# Patient Record
Sex: FEMALE | Race: WHITE | NOT HISPANIC OR LATINO | Employment: FULL TIME | URBAN - METROPOLITAN AREA
[De-identification: names, ages, dates, MRNs, and addresses within clinical notes are randomized per-mention and may not be internally consistent; named-entity substitution may affect disease eponyms.]

---

## 2019-10-10 ENCOUNTER — HOSPITAL ENCOUNTER (OUTPATIENT)
Dept: MAMMOGRAPHY | Facility: CLINIC | Age: 59
Discharge: HOME | End: 2019-10-10
Attending: PHYSICIAN ASSISTANT | Admitting: PHYSICIAN ASSISTANT
Payer: COMMERCIAL

## 2019-10-10 DIAGNOSIS — Z12.31 VISIT FOR SCREENING MAMMOGRAM: ICD-10-CM

## 2019-10-10 PROCEDURE — 77067 SCR MAMMO BI INCL CAD: CPT

## 2019-10-30 ENCOUNTER — HOSPITAL ENCOUNTER (OUTPATIENT)
Dept: ULTRASOUND IMAGING | Facility: CLINIC | Age: 59
End: 2019-10-30
Attending: PHYSICIAN ASSISTANT
Payer: COMMERCIAL

## 2019-10-30 ENCOUNTER — HOSPITAL ENCOUNTER (OUTPATIENT)
Dept: MAMMOGRAPHY | Facility: CLINIC | Age: 59
Discharge: HOME | End: 2019-10-30
Attending: PHYSICIAN ASSISTANT | Admitting: PHYSICIAN ASSISTANT
Payer: COMMERCIAL

## 2019-10-30 DIAGNOSIS — R92.8 ABNORMAL MAMMOGRAM: ICD-10-CM

## 2019-10-30 PROCEDURE — 76642 ULTRASOUND BREAST LIMITED: CPT | Mod: LT

## 2019-10-30 PROCEDURE — G0279 TOMOSYNTHESIS, MAMMO: HCPCS

## 2021-05-17 ENCOUNTER — OFFICE VISIT (OUTPATIENT)
Dept: INTERNAL MEDICINE | Facility: CLINIC | Age: 61
End: 2021-05-17
Payer: COMMERCIAL

## 2021-05-17 VITALS
WEIGHT: 239.3 LBS | SYSTOLIC BLOOD PRESSURE: 156 MMHG | HEIGHT: 60 IN | HEART RATE: 50 BPM | TEMPERATURE: 97.7 F | BODY MASS INDEX: 46.98 KG/M2 | DIASTOLIC BLOOD PRESSURE: 83 MMHG | OXYGEN SATURATION: 94 % | RESPIRATION RATE: 16 BRPM

## 2021-05-17 DIAGNOSIS — F41.9 ANXIETY: ICD-10-CM

## 2021-05-17 DIAGNOSIS — I10 ESSENTIAL HYPERTENSION: Primary | ICD-10-CM

## 2021-05-17 DIAGNOSIS — G47.9 SLEEP DISTURBANCE: ICD-10-CM

## 2021-05-17 DIAGNOSIS — E66.01 MORBID OBESITY (H): ICD-10-CM

## 2021-05-17 DIAGNOSIS — N76.1 CHRONIC VAGINITIS: ICD-10-CM

## 2021-05-17 LAB
ALBUMIN SERPL-MCNC: 3.4 G/DL (ref 3.4–5)
ALP SERPL-CCNC: 83 U/L (ref 40–150)
ALT SERPL W P-5'-P-CCNC: 26 U/L (ref 0–50)
ANION GAP SERPL CALCULATED.3IONS-SCNC: 3 MMOL/L (ref 3–14)
AST SERPL W P-5'-P-CCNC: 14 U/L (ref 0–45)
BILIRUB SERPL-MCNC: 0.5 MG/DL (ref 0.2–1.3)
BUN SERPL-MCNC: 16 MG/DL (ref 7–30)
CALCIUM SERPL-MCNC: 9.6 MG/DL (ref 8.5–10.1)
CHLORIDE SERPL-SCNC: 102 MMOL/L (ref 94–109)
CHOLEST SERPL-MCNC: 203 MG/DL
CO2 SERPL-SCNC: 32 MMOL/L (ref 20–32)
CREAT SERPL-MCNC: 0.68 MG/DL (ref 0.52–1.04)
ERYTHROCYTE [DISTWIDTH] IN BLOOD BY AUTOMATED COUNT: 15.2 % (ref 10–15)
GFR SERPL CREATININE-BSD FRML MDRD: >90 ML/MIN/{1.73_M2}
GLUCOSE SERPL-MCNC: 114 MG/DL (ref 70–99)
HCT VFR BLD AUTO: 51.2 % (ref 35–47)
HDLC SERPL-MCNC: 46 MG/DL
HGB BLD-MCNC: 16.4 G/DL (ref 11.7–15.7)
LDLC SERPL CALC-MCNC: 133 MG/DL
MCH RBC QN AUTO: 29.6 PG (ref 26.5–33)
MCHC RBC AUTO-ENTMCNC: 32 G/DL (ref 31.5–36.5)
MCV RBC AUTO: 92 FL (ref 78–100)
NONHDLC SERPL-MCNC: 157 MG/DL
PLATELET # BLD AUTO: 309 10E9/L (ref 150–450)
POTASSIUM SERPL-SCNC: 3.7 MMOL/L (ref 3.4–5.3)
PROT SERPL-MCNC: 7.7 G/DL (ref 6.8–8.8)
RBC # BLD AUTO: 5.54 10E12/L (ref 3.8–5.2)
SODIUM SERPL-SCNC: 137 MMOL/L (ref 133–144)
TRIGL SERPL-MCNC: 121 MG/DL
TSH SERPL DL<=0.005 MIU/L-ACNC: 1.77 MU/L (ref 0.4–4)
WBC # BLD AUTO: 11 10E9/L (ref 4–11)

## 2021-05-17 PROCEDURE — 84443 ASSAY THYROID STIM HORMONE: CPT | Performed by: NURSE PRACTITIONER

## 2021-05-17 PROCEDURE — 36415 COLL VENOUS BLD VENIPUNCTURE: CPT | Performed by: NURSE PRACTITIONER

## 2021-05-17 PROCEDURE — 85027 COMPLETE CBC AUTOMATED: CPT | Performed by: NURSE PRACTITIONER

## 2021-05-17 PROCEDURE — 80053 COMPREHEN METABOLIC PANEL: CPT | Performed by: NURSE PRACTITIONER

## 2021-05-17 PROCEDURE — 80061 LIPID PANEL: CPT | Performed by: NURSE PRACTITIONER

## 2021-05-17 PROCEDURE — 99204 OFFICE O/P NEW MOD 45 MIN: CPT | Performed by: NURSE PRACTITIONER

## 2021-05-17 RX ORDER — ATENOLOL 100 MG/1
100 TABLET ORAL DAILY
Qty: 90 TABLET | Refills: 3 | Status: SHIPPED | OUTPATIENT
Start: 2021-05-17 | End: 2022-02-23

## 2021-05-17 RX ORDER — HYDROXYZINE HYDROCHLORIDE 25 MG/1
TABLET, FILM COATED ORAL
COMMUNITY
Start: 2020-08-18 | End: 2022-12-30

## 2021-05-17 RX ORDER — PHENTERMINE HYDROCHLORIDE 37.5 MG/1
37.5 TABLET ORAL
COMMUNITY
End: 2022-03-17

## 2021-05-17 RX ORDER — ATENOLOL AND CHLORTHALIDONE TABLET 100; 25 MG/1; MG/1
1 TABLET ORAL DAILY
COMMUNITY
Start: 2021-03-23 | End: 2022-02-25

## 2021-05-17 RX ORDER — DILTIAZEM HCL 60 MG
TABLET ORAL
COMMUNITY
Start: 2021-03-25 | End: 2022-03-17

## 2021-05-17 ASSESSMENT — MIFFLIN-ST. JEOR: SCORE: 1576.96

## 2021-05-17 NOTE — PROGRESS NOTES
Assessment & Plan     Essential hypertension    - CBC with platelets  - Comprehensive metabolic panel  - Lipid Profile  - TSH with free T4 reflex  - diltiazem ER COATED BEADS (CARDIAZEM LA) 120 MG 24 hr tablet; Take 1 tablet (120 mg) by mouth daily  - atenolol (TENORMIN) 100 MG tablet; Take 1 tablet (100 mg) by mouth daily    Morbid obesity (H)    - COMPREHENSIVE WEIGHT MANAGEMENT    Anxiety    - sertraline (ZOLOFT) 50 MG tablet; Take 1 tablet (50 mg) by mouth daily  - DERMATOLOGY ADULT REFERRAL; Future    Sleep disturbance    - SLEEP EVALUATION & MANAGEMENT REFERRAL - ADULT -[facility] [place]  [phone] (Age 18 and up); Future    Chronic vaginitis    - conjugated estrogens (PREMARIN) 0.625 MG/GM vaginal cream; Place 0.5 g vaginally twice a week    Med changes and additions reviewed  F/u HTN 6 weeks  Wt management and sleep referrals done           Tobacco Cessation:   reports that she has been smoking cigarettes. She has a 15.00 pack-year smoking history. She has never used smokeless tobacco.  Tobacco Cessation Action Plan: Information offered: Patient not interested at this time    BMI:   Estimated body mass index is 46.74 kg/m  as calculated from the following:    Height as of this encounter: 1.524 m (5').    Weight as of this encounter: 108.5 kg (239 lb 4.8 oz).   Weight management plan: Patient referred to endocrine and/or weight management specialty        Palma Cartagena NP  [facility] WILLIE Dennis is a 61 year old who presents for the following health issues     HPI     New Patient/Transfer of Care  Hypertension Follow-up      Do you check your blood pressure regularly outside of the clinic? Yes     Are you following a low salt diet? Yes    Are your blood pressures ever more than 140 on the top number (systolic) OR more   than 90 on the bottom number (diastolic), for example 140/90? Yes   Will double her BP meds if concerned about high  BP  hydrochlorothiazide makes her groggy  Hydroxyzine gives her dry mouth    Anxiety Follow-Up    How are you doing with your anxiety since your last visit? No change    Are you having other symptoms that might be associated with anxiety? No    Have you had a significant life event? Job Concerns     Are you feeling depressed? No    Do you have any concerns with your use of alcohol or other drugs? No    Social History     Tobacco Use     Smoking status: Current Every Day Smoker     Packs/day: 0.75     Years: 20.00     Pack years: 15.00     Types: Cigarettes     Smokeless tobacco: Never Used   Substance Use Topics     Alcohol use: No     Drug use: No       Review of Systems   CONSTITUTIONAL:POSITIVE  for weight gain  ENT/MOUTH: NEGATIVE for ear, mouth and throat problems  RESP: NEGATIVE for significant cough or SOB  CV: NEGATIVE for chest pain, palpitations or peripheral edema  GI: NEGATIVE for nausea, abdominal pain, heartburn, or change in bowel habits  ENDOCRINE: NEGATIVE for temperature intolerance, skin/hair changes  PSYCHIATRIC: POSITIVE foranxiety      Objective    BP (!) 156/83   Pulse 50   Temp 97.7  F (36.5  C) (Oral)   Resp 16   Ht 1.524 m (5')   Wt 108.5 kg (239 lb 4.8 oz)   LMP  (LMP Unknown)   SpO2 94%   BMI 46.74 kg/m      Physical Exam   GENERAL: alert, no distress and obese  PSYCH: mentation appears normal, tangential and anxious

## 2021-05-17 NOTE — NURSING NOTE
Patient here to establish care.  Vital signs:  Temp: 97.7  F (36.5  C) Temp src: Oral BP: (!) 156/83 Pulse: 50   Resp: 16 SpO2: 94 %     Height: 152.4 cm (5') Weight: 108.5 kg (239 lb 4.8 oz)  Estimated body mass index is 46.74 kg/m  as calculated from the following:    Height as of this encounter: 1.524 m (5').    Weight as of this encounter: 108.5 kg (239 lb 4.8 oz).

## 2021-09-01 ENCOUNTER — TELEPHONE (OUTPATIENT)
Dept: INTERNAL MEDICINE | Facility: CLINIC | Age: 61
End: 2021-09-01

## 2021-09-01 DIAGNOSIS — F41.9 ANXIETY: ICD-10-CM

## 2021-09-01 NOTE — TELEPHONE ENCOUNTER
Reason for Call:  Medication or medication refill:    Do you use a [facility]?  Name of the pharmacy and phone number for the current request:  [facility] [place] on [address]     Name of the medication requested: sertraline (ZOLOFT) 50 MG tablet    Other request: Patient was in recently and the provider refilled all of their prescriptions except this one, patient was hoping the provider could also refill this script.    Can we leave a detailed message on this number? YES    Phone number patient can be reached at: Home number on file [phone] (home) or Cell number on file:    Telephone Information:   Mobile [phone]       Best Time: N/a    Call taken on 9/1/2021 at 4:19 PM by Osvaldo Camara

## 2021-09-14 ENCOUNTER — OFFICE VISIT (OUTPATIENT)
Dept: DERMATOLOGY | Facility: CLINIC | Age: 61
End: 2021-09-14
Attending: NURSE PRACTITIONER
Payer: COMMERCIAL

## 2021-09-14 VITALS — BODY MASS INDEX: 44.92 KG/M2 | OXYGEN SATURATION: 93 % | HEART RATE: 62 BPM | WEIGHT: 230 LBS

## 2021-09-14 DIAGNOSIS — L82.0 INFLAMED SEBORRHEIC KERATOSIS: ICD-10-CM

## 2021-09-14 DIAGNOSIS — D48.5 NEOPLASM OF UNCERTAIN BEHAVIOR OF SKIN: ICD-10-CM

## 2021-09-14 DIAGNOSIS — L82.1 SEBORRHEIC KERATOSIS: ICD-10-CM

## 2021-09-14 DIAGNOSIS — D22.9 NEVUS: Primary | ICD-10-CM

## 2021-09-14 DIAGNOSIS — D18.01 ANGIOMA OF SKIN: ICD-10-CM

## 2021-09-14 DIAGNOSIS — L81.4 LENTIGO: ICD-10-CM

## 2021-09-14 PROCEDURE — 17110 DESTRUCTION B9 LES UP TO 14: CPT | Performed by: PHYSICIAN ASSISTANT

## 2021-09-14 PROCEDURE — 99203 OFFICE O/P NEW LOW 30 MIN: CPT | Mod: 25 | Performed by: PHYSICIAN ASSISTANT

## 2021-09-14 PROCEDURE — 11102 TANGNTL BX SKIN SINGLE LES: CPT | Mod: 59 | Performed by: PHYSICIAN ASSISTANT

## 2021-09-14 PROCEDURE — 88305 TISSUE EXAM BY PATHOLOGIST: CPT | Performed by: DERMATOLOGY

## 2021-09-14 PROCEDURE — 88342 IMHCHEM/IMCYTCHM 1ST ANTB: CPT | Performed by: DERMATOLOGY

## 2021-09-14 NOTE — LETTER
9/14/2021         RE: Jeannie Barger  [address] [address]        Dear Colleague,    Thank you for referring your patient, Jeannie Barger, to the [facility]. Please see a copy of my visit note below.    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   ROS of the following were done and are negative: Constitutional, Eyes, Ears, Nose,   Mouth, Throat, Cardiovascular, Respiratory, GI, Genitourinary, Musculoskeletal,   Psychiatric, Endocrine, Allergic/Immunologic.    PHYSICAL EXAM:   Pulse 62   Wt 104.3 kg (230 lb)   LMP  (LMP Unknown)   SpO2 93%   BMI 44.92 kg/m    Skin exam performed as follows: Type 2 skin. Mood appropriate  Alert and Oriented X 3. Well developed, well nourished in no distress.  General appearance: Normal  Head including face: Normal  Eyes: conjunctiva and lids: Normal  Mouth: Lips, teeth, gums: Normal  Neck: Normal  Chest-breast/axillae: Normal  Back: Normal  Spleen and liver: Normal  Cardiovascular: Exam of peripheral vascular system by observation for swelling, varicosities, edema: Normal  Genitalia: groin, buttocks: Normal  Extremities: digits/nails (clubbing): Normal  Eccrine and Apocrine glands: Normal  Right upper extremity: Normal  Left upper extremity: Normal  Right lower extremity: Normal  Left lower extremity:  Normal  Skin: Scalp and body hair: See below    Pt deferred exam of breasts, groin, buttocks: No    Other physical findings:  1. Multiple pigmented macules on extremities and trunk  2. Multiple pigmented macules on face, trunk and extremities  3. Multiple vascular papules on trunk, arms and legs  4. Multiple scattered keratotic plaques  5. Irregular 6 mm black macule on the right upper forehead  6. Inflamed keratotic papule on the right temple x 1, nasal bridge x 1, neck x 3, left upper cheek x 1, right breast x 1, left breast x 1, back x 1       Except as noted above, no other signs of skin cancer or melanoma.     ASSESSMENT/PLAN:   Benign Full skin cancer screening today. . Patient with history of none  Advised on monthly self exams and 1 year  Patient Education: Appropriate brochures given.    1. Multiple benign appearing nevi on arms, legs and trunk. Discussed ABCDEs of melanoma and sunscreen.   2. Multiple lentigos on arms, legs and trunk. Advised benign, no treatment needed.  3. Multiple scattered angiomas. Advised benign, no treatment needed.   4. Seborrheic keratosis on arms, legs and trunk. Advised benign, no treatment needed.  5. Lentigo r/o lentigo maligna on the right upper forehead. Shave biopsy performed.  Area cleaned and anesthetized with 1% lidocaine with epinephrine.  Dermablade used to remove the lesion and sent to pathology. Bleeding was cauterized. Pt tolerated procedure well with no complications.   6. Inflamed seborrheic keratosis on the right temple x 1, nasal bridge x 1, neck x 3, left upper cheek x 1, right breast x 1, left breast x 1, back x 1. As physically tender cryosurgery performed. Advised on post op care.             Follow-up: yearly FSE/PRN sooner    1.) Patient was asked about new and changing moles. YES  2.) Patient received a complete physical skin examination: YES  3.) Patient was counseled to perform a monthly self skin examination: YES  Scribed By: Denice Maurer MS,  RYAN          Again, thank you for allowing me to participate in the care of your patient.        Sincerely,        Denice Casper PA-C

## 2021-09-14 NOTE — NURSING NOTE
Initial Pulse 62   Wt 104.3 kg (230 lb)   LMP  (LMP Unknown)   SpO2 93%   BMI 44.92 kg/m   Estimated body mass index is 44.92 kg/m  as calculated from the following:    Height as of 5/17/21: 1.524 m (5').    Weight as of this encounter: 104.3 kg (230 lb). .    JITENDRA Bhatia-ARIEN-PHN  [facility]  [phone]

## 2021-09-14 NOTE — PATIENT INSTRUCTIONS
Wound Care Instructions     FOR SUPERFICIAL WOUNDS    [facility] [phone]                       AFTER 24 HOURS YOU SHOULD REMOVE THE BANDAGE AND BEGIN DAILY DRESSING CHANGES AS FOLLOWS:     1) Remove Dressing.     2) Clean and dry the area with tap water using a Q-tip or sterile gauze pad.     3) Apply Vaseline, Aquaphor, Polysporin ointment or Bacitracin ointment over entire wound.  Do NOT use Neosporin ointment.     4) Cover the wound with a band-aid, or a sterile non-stick gauze pad and micropore paper tape      REPEAT THESE INSTRUCTIONS AT LEAST ONCE A DAY UNTIL THE WOUND HAS COMPLETELY HEALED.    It is an old wives tale that a wound heals better when it is exposed to air and allowed to dry out. The wound will heal faster with a better cosmetic result if it is kept moist with ointment and covered with a bandage.    **Do not let the wound dry out.**      Supplies Needed:      *Cotton tipped applicators (Q-tips)    *Polysporin Ointment or Bacitracin Ointment (NOT NEOSPORIN)    *Band-aids or non-stick gauze pads and micropore paper tape.      PATIENT INFORMATION:    During the healing process you will notice a number of changes. All wounds develop a small halo of redness surrounding the wound.  This means healing is occurring. Severe itching with extensive redness usually indicates sensitivity to the ointment or bandage tape used to dress the wound.  You should call our office if this develops.      Swelling  and/or discoloration around your surgical site is common, particularly when performed around the eye.    All wounds normally drain.  The larger the wound the more drainage there will be.  After 7-10 days, you will notice the wound beginning to shrink and new skin will begin to grow.  The wound is healed when you can see skin has formed over the entire area.  A healed wound has a healthy, shiny look to the surface and is red to dark pink in color to normalize.  Wounds may take approximately  4-6 weeks to heal.  Larger wounds may take 6-8 weeks.  After the wound is healed you may discontinue dressing changes.    You may experience a sensation of tightness as your wound heals. This is normal and will gradually subside.    Your healed wound may be sensitive to temperature changes. This sensitivity improves with time, but if you re having a lot of discomfort, try to avoid temperature extremes.    Patients frequently experience itching after their wound appears to have healed because of the continue healing under the skin.  Plain Vaseline will help relieve the itching.        POSSIBLE COMPLICATIONS    BLEEDIN. Leave the bandage in place.  2. Use tightly rolled up gauze or a cloth to apply direct pressure over the bandage for 30  minutes.  3. Reapply pressure for an additional 30 minutes if necessary  4. Use additional gauze and tape to maintain pressure once the bleeding has stopped.  WOUND CARE INSTRUCTIONS   FOR CRYOSURGERY   This area treated with liquid nitrogen should form a blister (areas treated may or may not blister-skin may just turn dark and slough off). You do not need to bandage the area unless a blister forms and breaks (which may be a few days). When the blister breaks, begin daily dressing changes as follows:  1) Clean and dry the area with tap water using clean Q-tip or sterile gauze pad.   2) Apply Polysporin ointment or Bacitracin ointment over entire wound. Do NOT use Neosporin ointment.   3) Cover the wound with a band-aid or sterile non-stick gauze pad and micropore paper tape.   REPEAT THESE INSTRUCTIONS AT LEAST ONCE A DAY UNTIL THE WOUND HAS COMPLETELY HEALED.   It is an old wives tale that a wound heals better when it is exposed to air and allowed to dry out. The wound will heal faster with a better cosmetic result if it is kept moist with ointment and covered with a bandage.   Do not let the wound dry out.   IMPORTANT INFORMATION ON REVERSE SIDE   Supplies Needed:   *Cotton  tipped applicators (Q-tips)   *Polysporin ointment or Bacitracin ointment (NOT NEOSPORIN)   *Band-aids, or non stick gauze pads and micropore paper tape   PATIENT INFORMATION   During the healing process you will notice a number of changes. All wounds develop a small halo of redness surrounding the wound. This means healing is occurring. Severe itching with extensive redness usually indicates sensitivity to the ointment or bandage tape used to dress the wound. You should call our office if this develops.   Swelling and/or discoloration around your surgical site is common, particularly when performed around the eye.   All wounds normally drain. The larger the wound the more drainage there will be. After 7-10 days, you will notice the wound beginning to shrink and new skin will begin to grow. The wound is healed when you can see skin has formed over the entire area. A healed wound has a healthy, shiny look to the surface and is red to dark pink in color to normalize. Wounds may take approximately 4-6 weeks to heal. Larger wounds may take 6-8 weeks. After the wound is healed you may discontinue dressing changes.   You may experience a sensation of tightness as your wound heals. This is normal and will gradually subside.   Your healed wound may be sensitive to temperature changes. This sensitivity improves with time, but if you re having a lot of discomfort, try to avoid temperature extremes.   Patients frequently experience itching after their wound appears to have healed because of the continue healing under the skin. Plain Vaseline will help relieve the itching.          Statement Selected

## 2021-09-22 LAB
PATH REPORT.COMMENTS IMP SPEC: ABNORMAL
PATH REPORT.COMMENTS IMP SPEC: YES
PATH REPORT.FINAL DX SPEC: ABNORMAL
PATH REPORT.GROSS SPEC: ABNORMAL
PATH REPORT.MICROSCOPIC SPEC OTHER STN: ABNORMAL
PATH REPORT.RELEVANT HX SPEC: ABNORMAL

## 2021-09-24 ENCOUNTER — TELEPHONE (OUTPATIENT)
Dept: DERMATOLOGY | Facility: CLINIC | Age: 61
End: 2021-09-24

## 2021-09-24 NOTE — TELEPHONE ENCOUNTER
----- Message from Denice Maurer PA-C sent at 9/23/2021  9:05 AM CDT -----  Notified pt of results of MIS please schedule for excision. She is available today until 1:30 and then tomorrow between 9am and 130 pm.

## 2021-09-24 NOTE — TELEPHONE ENCOUNTER
Called and spoke to patient.    Patient notified of dx by provider- MIS, right upper forehead.    Educated patient on mohs, scheduled mohs appointment (NERY Fragoso), and letter/packet sent.    Patient voiced understanding.    JITENDRA Bhatia-BSN-N  [facility]  [phone]

## 2021-09-24 NOTE — LETTER
[facility]  [address]  [id number] [phone]    9/24/2021       Jeannie Barger  [address] [place] [address]      Dear Jeannie:    You are scheduled for Mohs Surgery on: 10/11/21 @7:30am in [place], [place].    [facility]  [address] [place] [address]  Phone: [phone]    You don't need to arrive more than 5-10 minutes prior to your appointment time.     Be sure to eat a good breakfast and bathe and wash your hair prior to surgery.     If you are taking any anti-coagulants that are prescribed by your Doctor (such as Coumadin/Warfarin, Plavix, Aspirin, Ibuprofen), please continue taking them.     However, if you are taking anti-coagulants over the counter without a Doctor's order for a medical condition, please discontinue them 10 days prior to surgery.     Please wear loose comfortable clothing as it could possibly be 4-6 hours until your surgery is completed depending upon how many layers of tissue need to be removed.      Thank you,    GERTRUDIS Ahn MD

## 2021-10-11 ENCOUNTER — OFFICE VISIT (OUTPATIENT)
Dept: DERMATOLOGY | Facility: CLINIC | Age: 61
End: 2021-10-11
Payer: COMMERCIAL

## 2021-10-11 VITALS — OXYGEN SATURATION: 93 % | SYSTOLIC BLOOD PRESSURE: 137 MMHG | DIASTOLIC BLOOD PRESSURE: 72 MMHG | HEART RATE: 47 BPM

## 2021-10-11 DIAGNOSIS — D03.30 MELANOMA IN SITU OF FACE (H): Primary | ICD-10-CM

## 2021-10-11 PROCEDURE — 17311 MOHS 1 STAGE H/N/HF/G: CPT | Performed by: DERMATOLOGY

## 2021-10-11 PROCEDURE — 99207 PR NO CHARGE LOS: CPT | Performed by: DERMATOLOGY

## 2021-10-11 PROCEDURE — 88342 IMHCHEM/IMCYTCHM 1ST ANTB: CPT | Mod: 59 | Performed by: DERMATOLOGY

## 2021-10-11 PROCEDURE — 13132 CMPLX RPR F/C/C/M/N/AX/G/H/F: CPT | Performed by: DERMATOLOGY

## 2021-10-11 PROCEDURE — 88341 IMHCHEM/IMCYTCHM EA ADD ANTB: CPT | Mod: 59 | Performed by: DERMATOLOGY

## 2021-10-11 NOTE — NURSING NOTE
Chief Complaint   Patient presents with     Derm Problem     Mohs- Forehead        Vitals:    10/11/21 0745   BP: 137/72   BP Location: Right arm   Patient Position: Sitting   Cuff Size: Adult Large   Pulse: (!) 47   SpO2: 93%     Wt Readings from Last 1 Encounters:   09/14/21 104.3 kg (230 lb)       Barbara Price LPN .................10/11/2021

## 2021-10-11 NOTE — PATIENT INSTRUCTIONS
Sutured Wound Care  Forehead     [facility]: [phone]    [facility]: [phone]          ? No strenuous activity for 48 hours. Resume moderate activity in 48 hours. No heavy exercising until you are seen for follow up in one week.     ? Take Tylenol as needed for discomfort.                         ? Do not drink alcoholic beverages for 48 hours.     ? Keep the pressure bandage in place for 24 hours. If the bandage becomes blood tinged or loose, reinforce it with gauze and tape.        (Refer to the reverse side of this page for management of bleeding).    ? Remove pressure bandage in 24 hours     ? Leave the flat bandage in place until your follow up appointment.    ? Keep the bandage dry. Wash around it carefully.    ? If the tape becomes soiled or starts to come off, reinforce it with additional paper tape.    ? Do not smoke for 3 weeks; smoking is detrimental to wound healing.    ? It is normal to have swelling and bruising around the surgical site. The bruising will fade in approximately 10-14 days. Elevate the area to reduce swelling.    ? Numbness, itchiness and sensitivity to temperature changes can occur after surgery and may take up to 18 months to normalize.      POSSIBLE COMPLICATIONS    BLEEDIN. Leave the bandage in place.  2. Use tightly rolled up gauze or a cloth to apply direct pressure over the bandage for 20   minutes.  3. Reapply pressure for an additional 20 minutes if necessary  4. Call the office or go to the nearest emergency room if pressure fails to stop the bleeding.  5. Use additional gauze and tape to maintain pressure once the bleeding has stopped.        PAIN:    1. Post operative pain should slowly get better, never worse.  2. A severe increase in pain may indicate a problem. Call the office if this occurs.    In case of emergency phone:Dr Ahn [phone]

## 2021-10-11 NOTE — PROGRESS NOTES
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 Patient has no other skin complaints today.  Remainder of the HPI, Meds, PMH, Allergies, FH, and SH was reviewed in chart.    No past medical history on file.    Past Surgical History:   Procedure Laterality Date     C  DELIVERY ONLY      , Low Cervical x 2     C LIGATE FALLOPIAN TUBE,POSTPARTUM      Tubal Ligation     C TREAT ECTOPIC PREG,NON REMVAL      Tube & Ectopic Preg., Removal     HC LAPAROSCOPY, SURGICAL; CHOLECYSTECTOMY      Cholecystectomy, Laparoscopic     HC REVISE MEDIAN N/CARPAL TUNNEL SURG       ZZC NONSPECIFIC PROCEDURE      Removal of cart. in l knee        Family History   Problem Relation Age of Onset     Diabetes Mother      Hypertension Mother      Heart Disease Paternal Grandmother         mid 60s     Hypertension Father      Multiple Sclerosis Brother      Hypertension Sister      Cancer No family hx of      Alzheimer Disease No family hx of        Social History     Socioeconomic History     Marital status:      Spouse name: Not on file     Number of children: Not on file     Years of education: Not on file     Highest education level: Not on file   Occupational History     Not on file   Tobacco Use     Smoking status: Current Every Day Smoker     Packs/day: 0.75     Years: 20.00     Pack years: 15.00     Types: Cigarettes     Smokeless tobacco: Never Used   Substance and Sexual Activity     Alcohol use: No     Drug use: No     Sexual activity: Yes     Partners: Male   Other Topics Concern     Not on file   Social History Narrative     Not on file     Social Determinants of Health     Financial Resource Strain:      Difficulty of Paying Living Expenses:    Food Insecurity:      Worried About Running Out of Food in the Last Year:      Ran Out of Food in the Last Year:    Transportation Needs:       Lack of Transportation (Medical):      Lack of Transportation (Non-Medical):    Physical Activity:      Days of Exercise per Week:      Minutes of Exercise per Session:    Stress:      Feeling of Stress :    Social Connections:      Frequency of Communication with Friends and Family:      Frequency of Social Gatherings with Friends and Family:      Attends Cheondoism Services:      Active Member of Clubs or Organizations:      Attends Club or Organization Meetings:      Marital Status:    Intimate Partner Violence:      Fear of Current or Ex-Partner:      Emotionally Abused:      Physically Abused:      Sexually Abused:        Outpatient Encounter Medications as of 10/11/2021   Medication Sig Dispense Refill     atenolol (TENORMIN) 100 MG tablet Take 1 tablet (100 mg) by mouth daily 90 tablet 3     atenolol-chlorthalidone (TENORETIC) 100-25 MG tablet Take 1 tablet by mouth daily       conjugated estrogens (PREMARIN) 0.625 MG/GM vaginal cream Place 0.5 g vaginally twice a week 30 g 1     diltiazem (CARDIZEM) 60 MG tablet TAKE 1 TABLET BY MOUTH TWICE A DAY       diltiazem ER COATED BEADS (CARDIAZEM LA) 120 MG 24 hr tablet Take 1 tablet (120 mg) by mouth daily 90 tablet 1     hydrOXYzine (ATARAX) 25 MG tablet TAKE 1 2 TABLETS EVERY 4 6 HOURS AS NEEDED FOR ANXIETY       phentermine (ADIPEX-P) 37.5 MG tablet Take 37.5 mg by mouth every morning (before breakfast)       sertraline (ZOLOFT) 50 MG tablet Take 1 tablet (50 mg) by mouth daily 90 tablet 2     No facility-administered encounter medications on file as of 10/11/2021.             O:   NAD, WDWN, Alert & Oriented, Mood & Affect wnl, Vitals stable   Here today alone   /72 (BP Location: Right arm, Patient Position: Sitting, Cuff Size: Adult Large)   Pulse (!) 47   LMP  (LMP Unknown)   SpO2 93%    General appearance normal   Vitals stable   Alert, oriented and in no acute distress      Following lymph nodes palpated: Occipital, Cervical, Supraclavicular no  lad   R upper forehead 6mm red macule      Eyes: Conjunctivae/lids:Normal     ENT: Lips, buccal mucosa, tongue: normal    MSK:Normal    Cardiovascular: peripheral edema none    Pulm: Breathing Normal    Neuro/Psych: Orientation:Alert and Orientedx3 ; Mood/Affect:normal       A/P:  1. R forehead melanoma in situ   MELANOMA DISCUSSED WITH PATIENT:  I discussed the specifics of tumor, prognosis, metachronous melanoma, self exam, and genetics with the patient. I explained the need for monthly skin exams including and taught the patient how to do this. Patient was asked about new or changing moles . I discussed with patient signs and symptoms that could arise in the setting of recurrent locoregional or metastatic disease. In addition, the need to undergo every 4 month dermatologic full skin survey and evaluation given that patients with a diagnosis of melanoma are at risk of recurrence (local and distant) and of subsequent de melissa melanoma.  . I reviewed treatment options, including a discussion of wide excision (the gold standard) versus Mohs surgery with MART-1 immunostains.     Note: MART-1 (Melanoma Antigen Recognized by T-cells) antibody immunostaining was used during Mohs surgery as per standard protocol, in addition to routine processing of all specimens with hematoxylin and eosin. The peripheral margins/edges were processed with the MART-1 stain (2 specimens total). The center was examined with hematoxylin & eosin and MART-1 immunostains. The patient was informed of the procedure and its risk/benefits during the consent for the procedure.    One or more of the reagents used in immunohistochemical testing in this case may not have been cleared or approved by the U.S. Food and Drug Administration (FDA). The FDA has determined that such clearance or approval is not necessary. These tests are used for clinical purposes. They should not be regarded as investigational or for research. These reagents  performance  characteristics have been determined by [facility]. This laboratory is certified under the Clinical Laboratory Improvement Amendments of 1988 (CLIA-88) as qualified to perform high complexity clinical laboratory testing.      MOHS:   Aggressive histology    The rationale for Mohs surgery was discussed with the patient and consent was obtained.  The risks and benefits as well as alternatives to therapy were discussed, in detail.  Specifically, the risks of infection, scarring, bleeding, prolonged wound healing, incomplete removal, allergy to anesthesia, nerve injury and recurrence were addressed.  Indication for Mohs was Aggressive histology. Prior to the procedure, the treatment site was clearly identified and, if available, confirmed with previous photos and confirmed by the patient   All components of the Universal Protocol/PAUSE rule were completed.  The Mohs surgeon operated in two distinct and integrated capacities as the surgeon and pathologist.      The area was prepped with Betasept.  A rim of normal appearing skin was marked circumferentially around the lesion.  The area was infiltrated with local anesthesia.  The tumor was first debulked to remove all clinically apparent tumor.  An incision following the standard Mohs approach was done and the specimen was oriented,mapped and placed in 3 block(s).  Each specimen was then chromacoded and processed in the Mohs laboratory using standard Mohs technique and submitted for frozen section histology.  Frozen section analysis showed no  residual tumor but CLEAR MARGINS.      The tumor was excised using standard Mohs technique in 1 stages(s).  MART 1 stains were performed on 2 specimens. CLEAR MARGINS OBTAINED and Final defect size was 1.5 cm.     We discussed the options for wound management in full with the patient including risks/benefits/ possible outcomes.      REPAIR COMPLEX: Because of the tightness of the surrounding skin and Because of the  size and full thickness nature of the defect, Because of the tightness of the surrounding skin and To maintain form and function, a complex closure was planned. After LEC anesthesia and prep, Burow's triangles were excised in the relaxed skin tension lines. The wound edges were widely undermined greater than width of the defect on both sides (1.5 cm) by dissection in the subgaleal plane until adequate tissue mobility was obtained. Hemostasis was obtained. The wound edges were closed in a layered fashion using Vicryl and Fast Absorbing Plain Gut sutures. Postoperative length was 4.5 cm.   EBL minimal; complications none; wound care routine.  The patient was discharged in good condition and will return in one week for wound evaluation.      It was a pleasure speaking to Jeannie Barger today.  Previous clinic notes and pertinent laboratory tests were reviewed prior to Jeannie Barger's visit.  Signs and Symptoms of skin cancer discussed with patient.  Patient encouraged to perform monthly skin exams.  UV precautions reviewed with patient.  Return to clinic 6 months

## 2021-10-11 NOTE — LETTER
10/11/2021         RE: Jeannie Barger  [address] [place] [address]        Dear Colleague,    Thank you for referring your patient, Jeannie Barger, to the [facility]. Please see a copy of my visit note below.    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 Patient has no other skin complaints today.  Remainder of the HPI, Meds, PMH, Allergies, FH, and SH was reviewed in chart.    No past medical history on file.    Past Surgical History:   Procedure Laterality Date     C  DELIVERY ONLY      , Low Cervical x 2     C LIGATE FALLOPIAN TUBE,POSTPARTUM      Tubal Ligation     C TREAT ECTOPIC PREG,NON REMVAL      Tube & Ectopic Preg., Removal     HC LAPAROSCOPY, SURGICAL; CHOLECYSTECTOMY      Cholecystectomy, Laparoscopic     HC REVISE MEDIAN N/CARPAL TUNNEL SURG       ZZC NONSPECIFIC PROCEDURE      Removal of cart. in l knee        Family History   Problem Relation Age of Onset     Diabetes Mother      Hypertension Mother      Heart Disease Paternal Grandmother         mid 60s     Hypertension Father      Multiple Sclerosis Brother      Hypertension Sister      Cancer No family hx of      Alzheimer Disease No family hx of        Social History     Socioeconomic History     Marital status:      Spouse name: Not on file     Number of children: Not on file     Years of education: Not on file     Highest education level: Not on file   Occupational History     Not on file   Tobacco Use     Smoking status: Current Every Day Smoker     Packs/day: 0.75     Years: 20.00     Pack years: 15.00     Types: Cigarettes     Smokeless tobacco: Never Used   Substance and Sexual Activity     Alcohol use: No     Drug use: No     Sexual activity: Yes     Partners: Male   Other Topics Concern     Not on file   Social History Narrative     Not on file      Social Determinants of Health     Financial Resource Strain:      Difficulty of Paying Living Expenses:    Food Insecurity:      Worried About Running Out of Food in the Last Year:      Ran Out of Food in the Last Year:    Transportation Needs:      Lack of Transportation (Medical):      Lack of Transportation (Non-Medical):    Physical Activity:      Days of Exercise per Week:      Minutes of Exercise per Session:    Stress:      Feeling of Stress :    Social Connections:      Frequency of Communication with Friends and Family:      Frequency of Social Gatherings with Friends and Family:      Attends Anabaptism Services:      Active Member of Clubs or Organizations:      Attends Club or Organization Meetings:      Marital Status:    Intimate Partner Violence:      Fear of Current or Ex-Partner:      Emotionally Abused:      Physically Abused:      Sexually Abused:        Outpatient Encounter Medications as of 10/11/2021   Medication Sig Dispense Refill     atenolol (TENORMIN) 100 MG tablet Take 1 tablet (100 mg) by mouth daily 90 tablet 3     atenolol-chlorthalidone (TENORETIC) 100-25 MG tablet Take 1 tablet by mouth daily       conjugated estrogens (PREMARIN) 0.625 MG/GM vaginal cream Place 0.5 g vaginally twice a week 30 g 1     diltiazem (CARDIZEM) 60 MG tablet TAKE 1 TABLET BY MOUTH TWICE A DAY       diltiazem ER COATED BEADS (CARDIAZEM LA) 120 MG 24 hr tablet Take 1 tablet (120 mg) by mouth daily 90 tablet 1     hydrOXYzine (ATARAX) 25 MG tablet TAKE 1 2 TABLETS EVERY 4 6 HOURS AS NEEDED FOR ANXIETY       phentermine (ADIPEX-P) 37.5 MG tablet Take 37.5 mg by mouth every morning (before breakfast)       sertraline (ZOLOFT) 50 MG tablet Take 1 tablet (50 mg) by mouth daily 90 tablet 2     No facility-administered encounter medications on file as of 10/11/2021.             O:   NAD, WDWN, Alert & Oriented, Mood & Affect wnl, Vitals stable   Here today alone   /72 (BP Location: Right arm, Patient  Position: Sitting, Cuff Size: Adult Large)   Pulse (!) 47   LMP  (LMP Unknown)   SpO2 93%    General appearance normal   Vitals stable   Alert, oriented and in no acute distress      Following lymph nodes palpated: Occipital, Cervical, Supraclavicular no lad   R upper forehead 6mm red macule      Eyes: Conjunctivae/lids:Normal     ENT: Lips, buccal mucosa, tongue: normal    MSK:Normal    Cardiovascular: peripheral edema none    Pulm: Breathing Normal    Neuro/Psych: Orientation:Alert and Orientedx3 ; Mood/Affect:normal       A/P:  1. R forehead melanoma in situ   MELANOMA DISCUSSED WITH PATIENT:  I discussed the specifics of tumor, prognosis, metachronous melanoma, self exam, and genetics with the patient. I explained the need for monthly skin exams including and taught the patient how to do this. Patient was asked about new or changing moles . I discussed with patient signs and symptoms that could arise in the setting of recurrent locoregional or metastatic disease. In addition, the need to undergo every 4 month dermatologic full skin survey and evaluation given that patients with a diagnosis of melanoma are at risk of recurrence (local and distant) and of subsequent de melissa melanoma.  . I reviewed treatment options, including a discussion of wide excision (the gold standard) versus Mohs surgery with MART-1 immunostains.     Note: MART-1 (Melanoma Antigen Recognized by T-cells) antibody immunostaining was used during Mohs surgery as per standard protocol, in addition to routine processing of all specimens with hematoxylin and eosin. The peripheral margins/edges were processed with the MART-1 stain (2 specimens total). The center was examined with hematoxylin & eosin and MART-1 immunostains. The patient was informed of the procedure and its risk/benefits during the consent for the procedure.    One or more of the reagents used in immunohistochemical testing in this case may not have been cleared or approved by  the U.S. Food and Drug Administration (FDA). The FDA has determined that such clearance or approval is not necessary. These tests are used for clinical purposes. They should not be regarded as investigational or for research. These reagents  performance characteristics have been determined by [facility]. This laboratory is certified under the Clinical Laboratory Improvement Amendments of 1988 (CLIA-88) as qualified to perform high complexity clinical laboratory testing.      MOHS:   Aggressive histology    The rationale for Mohs surgery was discussed with the patient and consent was obtained.  The risks and benefits as well as alternatives to therapy were discussed, in detail.  Specifically, the risks of infection, scarring, bleeding, prolonged wound healing, incomplete removal, allergy to anesthesia, nerve injury and recurrence were addressed.  Indication for Mohs was Aggressive histology. Prior to the procedure, the treatment site was clearly identified and, if available, confirmed with previous photos and confirmed by the patient   All components of the Universal Protocol/PAUSE rule were completed.  The Mohs surgeon operated in two distinct and integrated capacities as the surgeon and pathologist.      The area was prepped with Betasept.  A rim of normal appearing skin was marked circumferentially around the lesion.  The area was infiltrated with local anesthesia.  The tumor was first debulked to remove all clinically apparent tumor.  An incision following the standard Mohs approach was done and the specimen was oriented,mapped and placed in 3 block(s).  Each specimen was then chromacoded and processed in the Mohs laboratory using standard Mohs technique and submitted for frozen section histology.  Frozen section analysis showed no  residual tumor but CLEAR MARGINS.      The tumor was excised using standard Mohs technique in 1 stages(s).  MART 1 stains were performed on 2 specimens. CLEAR MARGINS  OBTAINED and Final defect size was 1.5 cm.     We discussed the options for wound management in full with the patient including risks/benefits/ possible outcomes.      REPAIR COMPLEX: Because of the tightness of the surrounding skin and Because of the size and full thickness nature of the defect, Because of the tightness of the surrounding skin and To maintain form and function, a complex closure was planned. After LEC anesthesia and prep, Burow's triangles were excised in the relaxed skin tension lines. The wound edges were widely undermined greater than width of the defect on both sides (1.5 cm) by dissection in the subgaleal plane until adequate tissue mobility was obtained. Hemostasis was obtained. The wound edges were closed in a layered fashion using Vicryl and Fast Absorbing Plain Gut sutures. Postoperative length was 4.5 cm.   EBL minimal; complications none; wound care routine.  The patient was discharged in good condition and will return in one week for wound evaluation.      It was a pleasure speaking to Jeannie Barger today.  Previous clinic notes and pertinent laboratory tests were reviewed prior to Jeannie Barger's visit.  Signs and Symptoms of skin cancer discussed with patient.  Patient encouraged to perform monthly skin exams.  UV precautions reviewed with patient.  Return to clinic 6 months        Again, thank you for allowing me to participate in the care of your patient.        Sincerely,        Parker Ahn MD

## 2021-10-11 NOTE — NURSING NOTE
Surgical Office Location :   [facility]  [address] [place] [address]

## 2021-10-18 ENCOUNTER — ALLIED HEALTH/NURSE VISIT (OUTPATIENT)
Dept: DERMATOLOGY | Facility: CLINIC | Age: 61
End: 2021-10-18
Payer: COMMERCIAL

## 2021-10-18 DIAGNOSIS — Z48.01 ENCOUNTER FOR CHANGE OR REMOVAL OF SURGICAL WOUND DRESSING: Primary | ICD-10-CM

## 2021-10-18 PROCEDURE — 99207 PR NO CHARGE NURSE ONLY: CPT

## 2021-10-18 NOTE — PATIENT INSTRUCTIONS

## 2021-10-18 NOTE — NURSING NOTE
Pt returned to clinic for post surgery 1 week follow up bandage change. Pt has no complaints, denies pain. Bandage removed from right forehead, area cleansed with normal saline. Site is healing and wound edges approximating well. Reapplied new steri strips and paper tape.    Advised to watch for signs/sx of infection; spreading redness, drainage, odor, fever. Call or report promptly to clinic. Pt given written instructions and informed to rtc as needed. Patient verbalized understanding.     JITENDRA Bhatia-BSN-PHN  [facility]  [phone]

## 2022-02-01 ENCOUNTER — HOSPITAL ENCOUNTER (OUTPATIENT)
Dept: MAMMOGRAPHY | Facility: CLINIC | Age: 62
Discharge: HOME | End: 2022-02-01
Payer: COMMERCIAL

## 2022-02-01 DIAGNOSIS — Z12.31 VISIT FOR SCREENING MAMMOGRAM: ICD-10-CM

## 2022-02-01 PROCEDURE — 77067 SCR MAMMO BI INCL CAD: CPT

## 2022-02-23 DIAGNOSIS — I10 ESSENTIAL HYPERTENSION: ICD-10-CM

## 2022-02-23 RX ORDER — ATENOLOL 100 MG/1
100 TABLET ORAL DAILY
Qty: 30 TABLET | Refills: 0 | Status: SHIPPED | OUTPATIENT
Start: 2022-02-23 | End: 2022-03-17

## 2022-02-23 RX ORDER — DILTIAZEM HCL 60 MG
TABLET ORAL
OUTPATIENT
Start: 2022-02-23

## 2022-02-23 NOTE — TELEPHONE ENCOUNTER
Routing refill request to provider for review/approval because:  Patient needs to be seen because:  Was supposed to follow up in June for blood pressure check.   Medication is reported historical-Diltiazem    Patient also getting refills from [facility]

## 2022-02-23 NOTE — LETTER
[facility]  [address], [address]  [place], [place]  [address]                                            TEL:[phone]  FAX:[phone]      Jeannie Barger  [address]  [facility] [address]      February 24, 2022    Dear Jeannie,    We are concerned about your health care.  We recently provided you with a medication refill.  Many medications require routine follow-up with your provider.      At this time we ask that: You schedule a routine office visit with your physician to follow your blood pressure    Your prescription: Has been refilled for 1 month so you may have time for the above noted follow-up.      Thank you,      Palma Cartagena C.N.P.

## 2022-02-25 DIAGNOSIS — I10 ESSENTIAL HYPERTENSION: ICD-10-CM

## 2022-02-25 RX ORDER — ATENOLOL AND CHLORTHALIDONE TABLET 100; 25 MG/1; MG/1
1 TABLET ORAL DAILY
Qty: 90 TABLET | Refills: 0 | Status: SHIPPED | OUTPATIENT
Start: 2022-02-25 | End: 2022-05-31

## 2022-02-25 NOTE — TELEPHONE ENCOUNTER
Pt calls regarding her refills. She states she is coming to [facility] now, not [facility].  Scheduled first available appt with Palma. 3/17/22.     She needs refills on her Atenolol/Chlorthalidone. She states she has always taken this.   Also needs the Diltiazem.   She prefers 90 days at a time if possible.     Last OV on 5/17/21    BP Readings from Last 3 Encounters:   10/11/21 137/72   05/17/21 (!) 156/83   09/11/03 122/80     Creatinine   Date Value Ref Range Status   05/17/2021 0.68 0.52 - 1.04 mg/dL Final

## 2022-03-17 ENCOUNTER — TELEPHONE (OUTPATIENT)
Dept: INTERNAL MEDICINE | Facility: CLINIC | Age: 62
End: 2022-03-17

## 2022-03-17 ENCOUNTER — OFFICE VISIT (OUTPATIENT)
Dept: INTERNAL MEDICINE | Facility: CLINIC | Age: 62
End: 2022-03-17
Payer: COMMERCIAL

## 2022-03-17 VITALS
WEIGHT: 250 LBS | RESPIRATION RATE: 24 BRPM | SYSTOLIC BLOOD PRESSURE: 177 MMHG | OXYGEN SATURATION: 94 % | BODY MASS INDEX: 49.08 KG/M2 | HEIGHT: 60 IN | TEMPERATURE: 98.3 F | DIASTOLIC BLOOD PRESSURE: 86 MMHG | HEART RATE: 62 BPM

## 2022-03-17 DIAGNOSIS — F41.9 ANXIETY: ICD-10-CM

## 2022-03-17 DIAGNOSIS — M54.50 LOW BACK PAIN, UNSPECIFIED BACK PAIN LATERALITY, UNSPECIFIED CHRONICITY, UNSPECIFIED WHETHER SCIATICA PRESENT: ICD-10-CM

## 2022-03-17 DIAGNOSIS — N95.2 ATROPHIC VAGINITIS: ICD-10-CM

## 2022-03-17 DIAGNOSIS — I10 BENIGN ESSENTIAL HYPERTENSION: Primary | ICD-10-CM

## 2022-03-17 DIAGNOSIS — R53.81 PHYSICAL DECONDITIONING: ICD-10-CM

## 2022-03-17 DIAGNOSIS — N76.1 CHRONIC VAGINITIS: ICD-10-CM

## 2022-03-17 DIAGNOSIS — E66.01 MORBID OBESITY (H): ICD-10-CM

## 2022-03-17 LAB
ERYTHROCYTE [DISTWIDTH] IN BLOOD BY AUTOMATED COUNT: 14.7 % (ref 10–15)
HCT VFR BLD AUTO: 52.1 % (ref 35–47)
HGB BLD-MCNC: 16.7 G/DL (ref 11.7–15.7)
MCH RBC QN AUTO: 29.8 PG (ref 26.5–33)
MCHC RBC AUTO-ENTMCNC: 32.1 G/DL (ref 31.5–36.5)
MCV RBC AUTO: 93 FL (ref 78–100)
PLATELET # BLD AUTO: 310 10E3/UL (ref 150–450)
RBC # BLD AUTO: 5.61 10E6/UL (ref 3.8–5.2)
WBC # BLD AUTO: 7.6 10E3/UL (ref 4–11)

## 2022-03-17 PROCEDURE — 80061 LIPID PANEL: CPT | Performed by: NURSE PRACTITIONER

## 2022-03-17 PROCEDURE — 85027 COMPLETE CBC AUTOMATED: CPT | Performed by: NURSE PRACTITIONER

## 2022-03-17 PROCEDURE — 80053 COMPREHEN METABOLIC PANEL: CPT | Performed by: NURSE PRACTITIONER

## 2022-03-17 PROCEDURE — 36415 COLL VENOUS BLD VENIPUNCTURE: CPT | Performed by: NURSE PRACTITIONER

## 2022-03-17 PROCEDURE — 99214 OFFICE O/P EST MOD 30 MIN: CPT | Performed by: NURSE PRACTITIONER

## 2022-03-17 RX ORDER — DILTIAZEM HYDROCHLORIDE 180 MG/1
180 CAPSULE, EXTENDED RELEASE ORAL DAILY
Qty: 90 CAPSULE | Refills: 3 | Status: SHIPPED | OUTPATIENT
Start: 2022-03-17 | End: 2022-09-08

## 2022-03-17 RX ORDER — SERTRALINE HYDROCHLORIDE 100 MG/1
100 TABLET, FILM COATED ORAL DAILY
Qty: 90 TABLET | Refills: 3 | Status: SHIPPED | OUTPATIENT
Start: 2022-03-17 | End: 2022-10-24

## 2022-03-17 ASSESSMENT — PATIENT HEALTH QUESTIONNAIRE - PHQ9: SUM OF ALL RESPONSES TO PHQ QUESTIONS 1-9: 17

## 2022-03-17 NOTE — LETTER
March 21, 2022      Jeannie Barger  [address] [place] [address]        Dear ,    We are writing to inform you of your test results.    Your glucose is elevated at 121 (normal <100) but not high enough to make you a diabetic. But it does indicate you are at high risk for developing diabetes mellitus. Exercise, avoiding simple carbohydrates in your diet and wt loss will all help to lower this risk. Please work on these as you can and I recommend rechecking fasting blood sugar in 6 months.   Your cholesterol levels are mildly elevated also. Follow up in 6 months with labs.       Resulted Orders   CBC with platelets   Result Value Ref Range    WBC Count 7.6 4.0 - 11.0 10e3/uL    RBC Count 5.61 (H) 3.80 - 5.20 10e6/uL    Hemoglobin 16.7 (H) 11.7 - 15.7 g/dL    Hematocrit 52.1 (H) 35.0 - 47.0 %    MCV 93 78 - 100 fL    MCH 29.8 26.5 - 33.0 pg    MCHC 32.1 31.5 - 36.5 g/dL    RDW 14.7 10.0 - 15.0 %    Platelet Count 310 150 - 450 10e3/uL   Comprehensive metabolic panel (BMP + Alb, Alk Phos, ALT, AST, Total. Bili, TP)   Result Value Ref Range    Sodium 136 133 - 144 mmol/L    Potassium 4.1 3.4 - 5.3 mmol/L    Chloride 100 94 - 109 mmol/L    Carbon Dioxide (CO2) 29 20 - 32 mmol/L    Anion Gap 7 3 - 14 mmol/L    Urea Nitrogen 12 7 - 30 mg/dL    Creatinine 0.71 0.52 - 1.04 mg/dL    Calcium 9.7 8.5 - 10.1 mg/dL    Glucose 121 (H) 70 - 99 mg/dL    Alkaline Phosphatase 80 40 - 150 U/L    AST 16 0 - 45 U/L    ALT 27 0 - 50 U/L    Protein Total 7.6 6.8 - 8.8 g/dL    Albumin 3.1 (L) 3.4 - 5.0 g/dL    Bilirubin Total 0.5 0.2 - 1.3 mg/dL    GFR Estimate >90 >60 mL/min/1.73m2      Comment:      Effective December 21, 2021 eGFRcr in adults is calculated using the 2021 CKD-EPI creatinine equation which includes age and gender (Santiago et al., NEJM, DOI: 10.1056/MSJMjc7461201)   Lipid panel reflex to direct LDL Fasting   Result Value Ref Range    Cholesterol 210 (H) <200 mg/dL    Triglycerides 141 <150 mg/dL    Direct  Measure HDL 41 (L) >=50 mg/dL    LDL Cholesterol Calculated 141 (H) <=100 mg/dL    Non HDL Cholesterol 169 (H) <130 mg/dL    Patient Fasting > 8hrs? Yes     Narrative    Cholesterol  Desirable:  <200 mg/dL    Triglycerides  Normal:  Less than 150 mg/dL  Borderline High:  150-199 mg/dL  High:  200-499 mg/dL  Very High:  Greater than or equal to 500 mg/dL    Direct Measure HDL  Female:  Greater than or equal to 50 mg/dL   Male:  Greater than or equal to 40 mg/dL    LDL Cholesterol  Desirable:  <100mg/dL  Above Desirable:  100-129 mg/dL   Borderline High:  130-159 mg/dL   High:  160-189 mg/dL   Very High:  >= 190 mg/dL    Non HDL Cholesterol  Desirable:  130 mg/dL  Above Desirable:  130-159 mg/dL  Borderline High:  160-189 mg/dL  High:  190-219 mg/dL  Very High:  Greater than or equal to 220 mg/dL       If you have any questions or concerns, please call the clinic at the number listed above.       Sincerely,      Palma Cartagena NP

## 2022-03-17 NOTE — PROGRESS NOTES
"  Assessment & Plan     Benign essential hypertension    - diltiazem ER (DILT-XR) 180 MG 24 hr capsule; Take 1 capsule (180 mg) by mouth daily    Anxiety    - sertraline (ZOLOFT) 50 MG tablet; Take 2 tablets (100 mg) by mouth daily  - Adult Mental Health  Referral; Future    Morbid obesity (H)    - Comprehensive Weight Management; Future  - CBC with platelets; Future  - Comprehensive metabolic panel (BMP + Alb, Alk Phos, ALT, AST, Total. Bili, TP); Future  - Lipid panel reflex to direct LDL Fasting; Future  - CBC with platelets  - Comprehensive metabolic panel (BMP + Alb, Alk Phos, ALT, AST, Total. Bili, TP)  - Lipid panel reflex to direct LDL Fasting    Low back pain, unspecified back pain laterality, unspecified chronicity, unspecified whether sciatica present      Physical deconditioning      Chronic vaginitis    - conjugated estrogens (PREMARIN) 0.625 MG/GM vaginal cream; Place 0.5 g vaginally twice a week    Establish some exercise goals, building up endurance   counseling for anxiety, over eating behaviors  Ortho consult for c/o hand nodules, ankle swelling/pain after ORIF  Bariatric surgery consult  Labs pending  Consider sleep study after dental implants             BMI:   Estimated body mass index is 49.65 kg/m  as calculated from the following:    Height as of this encounter: 1.511 m (4' 11.5\").    Weight as of this encounter: 113.4 kg (250 lb).   Weight management plan: Patient referred to endocrine and/or weight management specialty    Depression Screening Follow Up    PHQ 3/17/2022   PHQ-9 Total Score 17   Q9: Thoughts of better off dead/self-harm past 2 weeks Not at all         Follow Up Actions Taken           Palma Cartagena NP  [facility] 62 year old who presents for the following health issues     HPI     Hypertension Follow-up      Do you check your blood pressure regularly outside of the clinic? No     Are you following a low " "salt diet? No    Are your blood pressures ever more than 140 on the top number (systolic) OR more   than 90 on the bottom number (diastolic), for example 140/90? No      How many servings of fruits and vegetables do you eat daily?  2-3    On average, how many sweetened beverages do you drink each day (Examples: soda, juice, sweet tea, etc.  Do NOT count diet or artificially sweetened beverages)?   0    How many days per week do you exercise enough to make your heart beat faster? 3 or less    How many minutes a day do you exercise enough to make your heart beat faster? 9 or less    How many days per week do you miss taking your medication? 0    Concern - obesity, over eating  Onset: lifetime  Description: stress and emotional eating, no exercise  Intensity: severe  Progression of Symptoms:  worsening  Accompanying Signs & Symptoms: none  Previous history of similar problem: yes  Precipitating factors:        Worsened by: stress  Alleviating factors:        Improved by: none  Therapies tried and outcome: phentamine, WW       Review of Systems   Constitutional, HEENT, cardiovascular, pulmonary, gi and gu systems are negative, except as otherwise noted.      Objective    LMP  (LMP Unknown)   BP (!) 177/86   Pulse 62   Temp 98.3  F (36.8  C) (Oral)   Resp 24   Ht 1.511 m (4' 11.5\")   Wt 113.4 kg (250 lb)   LMP  (LMP Unknown)   SpO2 94%   BMI 49.65 kg/m      Physical Exam   GENERAL: healthy, alert and no distress  EYES: Eyes grossly normal to inspection, PERRL and conjunctivae and sclerae normal  NECK: no adenopathy, no asymmetry, masses, or scars and thyroid normal to palpation  RESP: lungs clear to auscultation - no rales, rhonchi or wheezes  CV: regular rate and rhythm, normal S1 S2, no S3 or S4, no murmur, click or rub, no peripheral edema and peripheral pulses strong  ABDOMEN: soft, nontender, no hepatosplenomegaly, no masses and bowel sounds normal  PSYCH: tangential and anxious                "

## 2022-03-17 NOTE — TELEPHONE ENCOUNTER
Anna ([phone]) from [facility] calling.    1.  Sertraline needs to be change to 100 mg tablets once a day or do PA for 50 mg tablets taking 2 per day.  2.  Premarin needs a PA.  Pharmacy faxed a message about this today but someone faxed it back with no action taken.   BHAKTI Powell R.N.

## 2022-03-17 NOTE — LETTER
March 21, 2022      Jeannie Barger  [address] [place] [address]        Dear ,    We are writing to inform you of your test results.    Your glucose is elevated at 121 (normal <100) but not high enough to make you a diabetic. But it does indicate you are at high risk for developing diabetes mellitus. Exercise, avoiding simple carbohydrates in your diet and wt loss will all help to lower this risk. Please work on these as you can and I recommend rechecking fasting blood sugar in 6 months.   Your cholesterol levels are mildly elevated also. Follow up in 6 months with labs.        Resulted Orders   CBC with platelets   Result Value Ref Range    WBC Count 7.6 4.0 - 11.0 10e3/uL    RBC Count 5.61 (H) 3.80 - 5.20 10e6/uL    Hemoglobin 16.7 (H) 11.7 - 15.7 g/dL    Hematocrit 52.1 (H) 35.0 - 47.0 %    MCV 93 78 - 100 fL    MCH 29.8 26.5 - 33.0 pg    MCHC 32.1 31.5 - 36.5 g/dL    RDW 14.7 10.0 - 15.0 %    Platelet Count 310 150 - 450 10e3/uL   Comprehensive metabolic panel (BMP + Alb, Alk Phos, ALT, AST, Total. Bili, TP)   Result Value Ref Range    Sodium 136 133 - 144 mmol/L    Potassium 4.1 3.4 - 5.3 mmol/L    Chloride 100 94 - 109 mmol/L    Carbon Dioxide (CO2) 29 20 - 32 mmol/L    Anion Gap 7 3 - 14 mmol/L    Urea Nitrogen 12 7 - 30 mg/dL    Creatinine 0.71 0.52 - 1.04 mg/dL    Calcium 9.7 8.5 - 10.1 mg/dL    Glucose 121 (H) 70 - 99 mg/dL    Alkaline Phosphatase 80 40 - 150 U/L    AST 16 0 - 45 U/L    ALT 27 0 - 50 U/L    Protein Total 7.6 6.8 - 8.8 g/dL    Albumin 3.1 (L) 3.4 - 5.0 g/dL    Bilirubin Total 0.5 0.2 - 1.3 mg/dL    GFR Estimate >90 >60 mL/min/1.73m2      Comment:      Effective December 21, 2021 eGFRcr in adults is calculated using the 2021 CKD-EPI creatinine equation which includes age and gender (Santiago et al., NEJM, DOI: 10.1056/PHSOmv4801857)   Lipid panel reflex to direct LDL Fasting   Result Value Ref Range    Cholesterol 210 (H) <200 mg/dL    Triglycerides 141 <150 mg/dL    Direct  Measure HDL 41 (L) >=50 mg/dL    LDL Cholesterol Calculated 141 (H) <=100 mg/dL    Non HDL Cholesterol 169 (H) <130 mg/dL    Patient Fasting > 8hrs? Yes     Narrative    Cholesterol  Desirable:  <200 mg/dL    Triglycerides  Normal:  Less than 150 mg/dL  Borderline High:  150-199 mg/dL  High:  200-499 mg/dL  Very High:  Greater than or equal to 500 mg/dL    Direct Measure HDL  Female:  Greater than or equal to 50 mg/dL   Male:  Greater than or equal to 40 mg/dL    LDL Cholesterol  Desirable:  <100mg/dL  Above Desirable:  100-129 mg/dL   Borderline High:  130-159 mg/dL   High:  160-189 mg/dL   Very High:  >= 190 mg/dL    Non HDL Cholesterol  Desirable:  130 mg/dL  Above Desirable:  130-159 mg/dL  Borderline High:  160-189 mg/dL  High:  190-219 mg/dL  Very High:  Greater than or equal to 220 mg/dL       If you have any questions or concerns, please call the clinic at the number listed above.       Sincerely,      Palma Cartagena NP

## 2022-03-18 LAB
ALBUMIN SERPL-MCNC: 3.1 G/DL (ref 3.4–5)
ALP SERPL-CCNC: 80 U/L (ref 40–150)
ALT SERPL W P-5'-P-CCNC: 27 U/L (ref 0–50)
ANION GAP SERPL CALCULATED.3IONS-SCNC: 7 MMOL/L (ref 3–14)
AST SERPL W P-5'-P-CCNC: 16 U/L (ref 0–45)
BILIRUB SERPL-MCNC: 0.5 MG/DL (ref 0.2–1.3)
BUN SERPL-MCNC: 12 MG/DL (ref 7–30)
CALCIUM SERPL-MCNC: 9.7 MG/DL (ref 8.5–10.1)
CHLORIDE BLD-SCNC: 100 MMOL/L (ref 94–109)
CHOLEST SERPL-MCNC: 210 MG/DL
CO2 SERPL-SCNC: 29 MMOL/L (ref 20–32)
CREAT SERPL-MCNC: 0.71 MG/DL (ref 0.52–1.04)
FASTING STATUS PATIENT QL REPORTED: YES
GFR SERPL CREATININE-BSD FRML MDRD: >90 ML/MIN/1.73M2
GLUCOSE BLD-MCNC: 121 MG/DL (ref 70–99)
HDLC SERPL-MCNC: 41 MG/DL
LDLC SERPL CALC-MCNC: 141 MG/DL
NONHDLC SERPL-MCNC: 169 MG/DL
POTASSIUM BLD-SCNC: 4.1 MMOL/L (ref 3.4–5.3)
PROT SERPL-MCNC: 7.6 G/DL (ref 6.8–8.8)
SODIUM SERPL-SCNC: 136 MMOL/L (ref 133–144)
TRIGL SERPL-MCNC: 141 MG/DL

## 2022-03-22 NOTE — TELEPHONE ENCOUNTER
PRIOR AUTHORIZATION DENIED    Medication: Premarin     Denial Date: 3/22/2022    Denial Rationale: Patient has to first try/fail 3 formulary medications- estradiol vaginal tablets, Estring vaginal ring, estradiol vaginal cream.        Appeal Information: If provider would like to appeal this decision we will need a detailed letter of medical necessity to start the process. Then re-route this request back to the PA pool.

## 2022-03-22 NOTE — TELEPHONE ENCOUNTER
PA Initiation    Medication: Premarin   Insurance Company: [employer] [place] - Phone [phone] Fax [phone]  Pharmacy Filling the Rx: [id number] #[id number] - [place], [place] - [address]  Filling Pharmacy Phone: [phone]  Filling Pharmacy Fax: [phone]  Start Date: 3/22/2022

## 2022-03-23 RX ORDER — ESTRADIOL 0.1 MG/G
2 CREAM VAGINAL
Qty: 40 G | Refills: 3 | Status: SHIPPED | OUTPATIENT
Start: 2022-03-24 | End: 2022-09-08

## 2022-03-23 NOTE — TELEPHONE ENCOUNTER
Patient returned call to clinic.  Advised of message and new Rx.  No further questions at this time.

## 2022-03-31 DIAGNOSIS — Z01.818 PREOP EXAMINATION: Primary | ICD-10-CM

## 2022-03-31 DIAGNOSIS — R79.1 ELEVATED INR: ICD-10-CM

## 2022-05-26 DIAGNOSIS — I10 ESSENTIAL HYPERTENSION: ICD-10-CM

## 2022-05-31 RX ORDER — ATENOLOL AND CHLORTHALIDONE TABLET 100; 25 MG/1; MG/1
TABLET ORAL
Qty: 90 TABLET | Refills: 0 | Status: SHIPPED | OUTPATIENT
Start: 2022-05-31 | End: 2022-09-06

## 2022-05-31 NOTE — TELEPHONE ENCOUNTER
Tiadylt  mg Capsule  Routing refill request to provider for review/approval because:  Drug not active on patient's medication list  Blood Pressure out of range for [facility] refill protocol.    BP Readings from Last 3 Encounters:   03/17/22 (!) 177/86   10/11/21 137/72   05/17/21 (!) 156/83       Atenolol-chlorthal 100-25 mg  Routing refill request to provider for review/approval because:  Blood Pressure out of range for [facility] refill protocol.    BP Readings from Last 3 Encounters:   03/17/22 (!) 177/86   10/11/21 137/72   05/17/21 (!) 156/83       LOV 3/17/2022 Routing to team

## 2022-05-31 NOTE — TELEPHONE ENCOUNTER
It appears that her diltiazem was increased from 120 mg to 180 mg on March 17.  That prescription was sent to [facility] in [place] and has a full year's refills on it.   Please confirm that she is actually taking the 180 mg dose.  If not she should because her blood pressure was very high and then advised the pharmacy that the 120 mg was discontinued.

## 2022-06-03 RX ORDER — DILTIAZEM HYDROCHLORIDE 120 MG/1
CAPSULE, EXTENDED RELEASE ORAL
Qty: 90 CAPSULE | Refills: 1 | Status: SHIPPED | OUTPATIENT
Start: 2022-06-03 | End: 2022-11-02

## 2022-06-21 ENCOUNTER — TELEPHONE (OUTPATIENT)
Dept: VASCULAR SURGERY | Facility: CLINIC | Age: 62
End: 2022-06-21

## 2022-06-21 NOTE — TELEPHONE ENCOUNTER
"Pt called stated she got a referral to see one of our providers and would like to set up a consult, but has some concerns prior to coming in. Pt has a limp on her left thigh about the size of a nickel and tender to touch. Also on the left leg shin there is a warm red spot, this has been there for about a month. Left ankle is a dark discoloration and she \" can not move the skin\", and \" feels like the skin is stuck to the bone\"    Pt would like to be seen [facility] and will be back in town on Tuesday June 28, willing to go to either location.    # [phone] OK to leave detailed message.    Sally Cherry, Surgery Scheduler  [facility]    "

## 2022-06-21 NOTE — TELEPHONE ENCOUNTER
Called pt back and had to [facility] for her to give us a call back regarding some concerns with her leg(s). Informed pt our vein clinics are booked out at least a couple of weeks.    Gave pt our call back number.    Diana Finley RN  [facility]  Vein Clinic

## 2022-06-22 NOTE — TELEPHONE ENCOUNTER
"Pt called back. Pt c/o left leg on/off swelling with some purplish skin discoloration x 2 years, multiple spider veins and some bulging varicose veins. Pt stated her left anterior lower leg is tender, red and warm to touch. Denies any firmness or hardness to the area. \"Feels like bug bites\" at times. Pt denies pain when walking, mostly just tenderness in areas. Pt stated elevating her legs hasn't really helped. Pt does have some compression hose, but has trouble getting them on. Instructed pt how to don her compression hose or for her to wrap with an ace bandage to see if this helps her symptoms. Also, recommended pt to try ibuprofen, warm compress and elevation to see if these help her symptoms as well. Pt denies having a DVT hx but her father has had a DVT in the past. Pt denies SOB or chest pain. Pt understands to seek immediate medical attention if she does develop those symptoms.     Pt is scheduled for next available appt: 7/14 with Dr. Dunn and added pt to our cancellation list per her request, if something opens up sooner.    Diana Finley RN  [facility]  Vein Clinic  "

## 2022-07-06 ENCOUNTER — OFFICE VISIT (OUTPATIENT)
Dept: VASCULAR SURGERY | Facility: CLINIC | Age: 62
End: 2022-07-06
Payer: COMMERCIAL

## 2022-07-06 DIAGNOSIS — I83.812 VARICOSE VEINS OF LEFT LOWER EXTREMITY WITH PAIN: Primary | ICD-10-CM

## 2022-07-06 PROCEDURE — 99203 OFFICE O/P NEW LOW 30 MIN: CPT | Performed by: SURGERY

## 2022-07-06 ASSESSMENT — ENCOUNTER SYMPTOMS
BACK PAIN: 1
RESPIRATORY NEGATIVE: 1
NEUROLOGICAL NEGATIVE: 1
ENDOCRINE NEGATIVE: 1
PSYCHIATRIC NEGATIVE: 1
GASTROINTESTINAL NEGATIVE: 1
EYES NEGATIVE: 1
WEIGHT GAIN: 1
MUSCLE CRAMPS: 1

## 2022-07-06 NOTE — LETTER
7/6/2022         RE: Jeannie Barger  [address] [place] [address]        Dear Colleague,    Thank you for referring your patient, Jeannie Barger, to the [facility] [place]. Please see a copy of my visit note below.    VEIN SOLUTIONS CONSULT    Impression:   1.  Symptomatic left leg varicosities with pain.    Plan:   I had a nice discussion with Jeannie reviewing basic venous pathophysiology.  She has never utilized compression stockings.  She is given a prescription for for knee-high stockings of 20-30 mmHg pressure and she is instructed in the proper usage.  I will see her back again in about 3 months for a left leg venous competency study.  Ultimate treatment recommendations will be pending the outcome of her response to conservative therapy and the results of her left leg venous ultrasound.      HPI:   Jeannie Barger is a 62-year-old female who presents at this time for evaluation of symptomatic left leg varicosities.  She describes painful aching and tenderness with a reddened area overlying her mid left pretibial region and medial calf.  She has some visible varicosities at that level.  She has a familial history of varicosities involving one of her grandmothers.  She is status post 1 pregnancy.  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  She has never utilized compression stockings on a regular basis.  Her past medical history is otherwise noncontributory.      CURRENT MEDICATIONS  atenolol-chlorthalidone (TENORETIC) 100-25 MG tablet, TAKE 1 TABLET BY MOUTH EVERY DAY  conjugated estrogens (PREMARIN) 0.625 MG/GM vaginal cream, Place 0.5 g vaginally twice a week  diltiazem ER (DILT-XR) 180 MG 24 hr capsule, Take 1 capsule (180 mg) by mouth daily  estradiol (ESTRACE) 0.1 MG/GM vaginal cream, Place 2 g vaginally twice a week  hydrOXYzine (ATARAX) 25 MG tablet,   sertraline (ZOLOFT) 100 MG tablet, Take 1 tablet (100 mg) by mouth daily  sertraline  (ZOLOFT) 50 MG tablet, Take 2 tablets (100 mg) by mouth daily  TIADYLT  MG 24 hr ER beaded capsule, TAKE 1 CAPSULE BY MOUTH EVERY DAY    No current facility-administered medications on file prior to visit.        PAST MEDICAL HISTORY  No past medical history on file.      PAST SURGICAL HISTORY:  Past Surgical History:   Procedure Laterality Date     HC LAPAROSCOPY, SURGICAL; CHOLECYSTECTOMY      Cholecystectomy, Laparoscopic     HC REVISE MEDIAN N/CARPAL TUNNEL SURG       ZZC  DELIVERY ONLY      , Low Cervical x 2     ZZC LIGATE FALLOPIAN TUBE,POSTPARTUM      Tubal Ligation     Z NONSPECIFIC PROCEDURE      Removal of cart. in l knee     Z TREAT ECTOPIC PREG,NON REMVAL      Tube & Ectopic Preg., Removal       ALLERGIES   No Known Allergies    FAMILY HISTORY  Family History   Problem Relation Age of Onset     Diabetes Mother      Hypertension Mother      Aneurysm Mother         brain     Hypertension Father      Diabetes Type 2  Father      Peripheral Vascular Disease Father         fem pop bypass     Hypertension Sister      Multiple Sclerosis Brother      Heart Disease Paternal Grandmother         mid 60 --MI x 6     Cancer No family hx of      Alzheimer Disease No family hx of        SOCIAL HISTORY  Social History     Tobacco Use     Smoking status: Former Smoker     Packs/day: 0.75     Years: 20.00     Pack years: 15.00     Types: Cigarettes     Smokeless tobacco: Never Used   Vaping Use     Vaping Use: Never used   Substance Use Topics     Alcohol use: No     Drug use: No       ROS:   Review of Systems   Constitutional: Positive for malaise/fatigue and weight gain.   HENT: Negative.    Eyes: Negative.    Cardiovascular: Positive for leg swelling.   Respiratory: Negative.    Endocrine: Negative.    Skin: Positive for itching.   Musculoskeletal: Positive for back pain and muscle cramps.   Gastrointestinal: Negative.    Genitourinary: Negative.    Neurological: Negative.     Psychiatric/Behavioral: Negative.          EXAM:  LMP  (LMP Unknown)   Physical Exam  Constitutional:       Appearance: She is obese.   HENT:      Head: Normocephalic and atraumatic.   Eyes:      General: No scleral icterus.     Extraocular Movements: Extraocular movements intact.      Pupils: Pupils are equal, round, and reactive to light.   Cardiovascular:      Rate and Rhythm: Normal rate and regular rhythm.      Pulses:           Dorsalis pedis pulses are 1+ on the right side and 2+ on the left side.        Posterior tibial pulses are 2+ on the right side and 1+ on the left side.      Comments: Small cluster of varicosities on the medial aspect of the mid left calf.  Musculoskeletal:         General: Normal range of motion.      Cervical back: Normal range of motion.   Skin:     General: Skin is warm and dry.   Neurological:      General: No focal deficit present.      Mental Status: She is alert and oriented to person, place, and time. Mental status is at baseline.   Psychiatric:         Mood and Affect: Mood normal.         Behavior: Behavior normal.         Thought Content: Thought content normal.         Judgment: Judgment normal.       Labs:  LIPID RESULTS:  Lab Results   Component Value Date    CHOL 210 (H) 03/17/2022    CHOL 203 (H) 05/17/2021    HDL 41 (L) 03/17/2022    HDL 46 (L) 05/17/2021     (H) 03/17/2022     (H) 05/17/2021    TRIG 141 03/17/2022    TRIG 121 05/17/2021    CHOLHDLRATIO 4.1 10/16/2002       CBC RESULTS:  Lab Results   Component Value Date    WBC 7.6 03/17/2022    WBC 11.0 05/17/2021    RBC 5.61 (H) 03/17/2022    RBC 5.54 (H) 05/17/2021    HGB 16.7 (H) 03/17/2022    HGB 16.4 (H) 05/17/2021    HCT 52.1 (H) 03/17/2022    HCT 51.2 (H) 05/17/2021    MCV 93 03/17/2022    MCV 92 05/17/2021    MCH 29.8 03/17/2022    MCH 29.6 05/17/2021    MCHC 32.1 03/17/2022    MCHC 32.0 05/17/2021    RDW 14.7 03/17/2022    RDW 15.2 (H) 05/17/2021     03/17/2022     05/17/2021        BMP RESULTS:  Lab Results   Component Value Date     03/17/2022     05/17/2021    POTASSIUM 4.1 03/17/2022    POTASSIUM 3.7 05/17/2021    CHLORIDE 100 03/17/2022    CHLORIDE 102 05/17/2021    CO2 29 03/17/2022    CO2 32 05/17/2021    ANIONGAP 7 03/17/2022    ANIONGAP 3 05/17/2021     (H) 03/17/2022     (H) 05/17/2021    BUN 12 03/17/2022    BUN 16 05/17/2021    CR 0.71 03/17/2022    CR 0.68 05/17/2021    GFRESTIMATED >90 03/17/2022    GFRESTIMATED >90 05/17/2021    GFRESTBLACK >90 05/17/2021    MICHAEL 9.7 03/17/2022    MICHAEL 9.6 05/17/2021        A1C RESULTS:  No results found for: A1C      Imaging:  No results found for this or any previous visit (from the past 24 hour(s)).      Total length of this encounter was 20 minutes.        Jac English MD          Again, thank you for allowing me to participate in the care of your patient.        Sincerely,        Jac English MD

## 2022-07-06 NOTE — NURSING NOTE
Patient Reported symptoms:    Right leg   Heaviness A little of the time   Achiness A little of the time   Swelling A little of the time   Throbbing None of the time   Itching Some of the time   Appearance Slightly noticeable   Impact on work/activities Moderately reduced    Left Leg   Heaviness Most of the time   Achiness Most of the time   Swelling Most of the time   Throbbing Most of the time   Itching All of the time  Appearance Slightly noticeable   Impact on work/activities Moderately reduced

## 2022-07-06 NOTE — PROGRESS NOTES
VEIN SOLUTIONS CONSULT    Impression:   1.  Symptomatic left leg varicosities with pain.    Plan:   I had a nice discussion with Jeannie reviewing basic venous pathophysiology.  She has never utilized compression stockings.  She is given a prescription for for knee-high stockings of 20-30 mmHg pressure and she is instructed in the proper usage.  I will see her back again in about 3 months for a left leg venous competency study.  Ultimate treatment recommendations will be pending the outcome of her response to conservative therapy and the results of her left leg venous ultrasound.      HPI:   Jeannie Barger is a 62-year-old female who presents at this time for evaluation of symptomatic left leg varicosities.  She describes painful aching and tenderness with a reddened area overlying her mid left pretibial region and medial calf.  She has some visible varicosities at that level.  She has a familial history of varicosities involving one of her grandmothers.  She is status post 1 pregnancy.  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  She has never utilized compression stockings on a regular basis.  Her past medical history is otherwise noncontributory.      CURRENT MEDICATIONS  atenolol-chlorthalidone (TENORETIC) 100-25 MG tablet, TAKE 1 TABLET BY MOUTH EVERY DAY  conjugated estrogens (PREMARIN) 0.625 MG/GM vaginal cream, Place 0.5 g vaginally twice a week  diltiazem ER (DILT-XR) 180 MG 24 hr capsule, Take 1 capsule (180 mg) by mouth daily  estradiol (ESTRACE) 0.1 MG/GM vaginal cream, Place 2 g vaginally twice a week  hydrOXYzine (ATARAX) 25 MG tablet,   sertraline (ZOLOFT) 100 MG tablet, Take 1 tablet (100 mg) by mouth daily  sertraline (ZOLOFT) 50 MG tablet, Take 2 tablets (100 mg) by mouth daily  TIADYLT  MG 24 hr ER beaded capsule, TAKE 1 CAPSULE BY MOUTH EVERY DAY    No current facility-administered medications on file prior to visit.        PAST MEDICAL HISTORY  No past  medical history on file.      PAST SURGICAL HISTORY:  Past Surgical History:   Procedure Laterality Date     HC LAPAROSCOPY, SURGICAL; CHOLECYSTECTOMY      Cholecystectomy, Laparoscopic     HC REVISE MEDIAN N/CARPAL TUNNEL SURG       ZZC  DELIVERY ONLY      , Low Cervical x 2     Z LIGATE FALLOPIAN TUBE,POSTPARTUM      Tubal Ligation     [facility] NONSPECIFIC PROCEDURE      Removal of cart. in l knee     ZC TREAT ECTOPIC PREG,NON REMVAL      Tube & Ectopic Preg., Removal       ALLERGIES   No Known Allergies    FAMILY HISTORY  Family History   Problem Relation Age of Onset     Diabetes Mother      Hypertension Mother      Aneurysm Mother         brain     Hypertension Father      Diabetes Type 2  Father      Peripheral Vascular Disease Father         fem pop bypass     Hypertension Sister      Multiple Sclerosis Brother      Heart Disease Paternal Grandmother         mid 60 --MI x 6     Cancer No family hx of      Alzheimer Disease No family hx of        SOCIAL HISTORY  Social History     Tobacco Use     Smoking status: Former Smoker     Packs/day: 0.75     Years: 20.00     Pack years: 15.00     Types: Cigarettes     Smokeless tobacco: Never Used   Vaping Use     Vaping Use: Never used   Substance Use Topics     Alcohol use: No     Drug use: No       ROS:   Review of Systems   Constitutional: Positive for malaise/fatigue and weight gain.   HENT: Negative.    Eyes: Negative.    Cardiovascular: Positive for leg swelling.   Respiratory: Negative.    Endocrine: Negative.    Skin: Positive for itching.   Musculoskeletal: Positive for back pain and muscle cramps.   Gastrointestinal: Negative.    Genitourinary: Negative.    Neurological: Negative.    Psychiatric/Behavioral: Negative.          EXAM:  LMP  (LMP Unknown)   Physical Exam  Constitutional:       Appearance: She is obese.   HENT:      Head: Normocephalic and atraumatic.   Eyes:      General: No scleral icterus.     Extraocular  Movements: Extraocular movements intact.      Pupils: Pupils are equal, round, and reactive to light.   Cardiovascular:      Rate and Rhythm: Normal rate and regular rhythm.      Pulses:           Dorsalis pedis pulses are 1+ on the right side and 2+ on the left side.        Posterior tibial pulses are 2+ on the right side and 1+ on the left side.      Comments: Small cluster of varicosities on the medial aspect of the mid left calf.  Musculoskeletal:         General: Normal range of motion.      Cervical back: Normal range of motion.   Skin:     General: Skin is warm and dry.   Neurological:      General: No focal deficit present.      Mental Status: She is alert and oriented to person, place, and time. Mental status is at baseline.   Psychiatric:         Mood and Affect: Mood normal.         Behavior: Behavior normal.         Thought Content: Thought content normal.         Judgment: Judgment normal.       Labs:  LIPID RESULTS:  Lab Results   Component Value Date    CHOL 210 (H) 03/17/2022    CHOL 203 (H) 05/17/2021    HDL 41 (L) 03/17/2022    HDL 46 (L) 05/17/2021     (H) 03/17/2022     (H) 05/17/2021    TRIG 141 03/17/2022    TRIG 121 05/17/2021    CHOLHDLRATIO 4.1 10/16/2002       CBC RESULTS:  Lab Results   Component Value Date    WBC 7.6 03/17/2022    WBC 11.0 05/17/2021    RBC 5.61 (H) 03/17/2022    RBC 5.54 (H) 05/17/2021    HGB 16.7 (H) 03/17/2022    HGB 16.4 (H) 05/17/2021    HCT 52.1 (H) 03/17/2022    HCT 51.2 (H) 05/17/2021    MCV 93 03/17/2022    MCV 92 05/17/2021    MCH 29.8 03/17/2022    MCH 29.6 05/17/2021    MCHC 32.1 03/17/2022    MCHC 32.0 05/17/2021    RDW 14.7 03/17/2022    RDW 15.2 (H) 05/17/2021     03/17/2022     05/17/2021       BMP RESULTS:  Lab Results   Component Value Date     03/17/2022     05/17/2021    POTASSIUM 4.1 03/17/2022    POTASSIUM 3.7 05/17/2021    CHLORIDE 100 03/17/2022    CHLORIDE 102 05/17/2021    CO2 29 03/17/2022    CO2 32  05/17/2021    ANIONGAP 7 03/17/2022    ANIONGAP 3 05/17/2021     (H) 03/17/2022     (H) 05/17/2021    BUN 12 03/17/2022    BUN 16 05/17/2021    CR 0.71 03/17/2022    CR 0.68 05/17/2021    GFRESTIMATED >90 03/17/2022    GFRESTIMATED >90 05/17/2021    GFRESTBLACK >90 05/17/2021    MICHAEL 9.7 03/17/2022    MICHAEL 9.6 05/17/2021        A1C RESULTS:  No results found for: A1C      Imaging:  No results found for this or any previous visit (from the past 24 hour(s)).      Total length of this encounter was 20 minutes.        Jac English MD

## 2022-09-01 DIAGNOSIS — I10 ESSENTIAL HYPERTENSION: ICD-10-CM

## 2022-09-01 NOTE — LETTER
[place] [address] [facility] [address]  Phone: [phone]        September 6, 2022      Jeannie [address]  [facility] [address]            Dear Ms. Barger,    We are concerned about your health care.  We recently provided you with a medication refill.  Many medications require routine follow-up with your Doctor.      At this time we ask that: {APPT REQ:228439}    Your prescription: {PRESCRIPTION:812205}      Thank you,      {PROVIDERS ALL CLINICS:[id number]}/ ***

## 2022-09-01 NOTE — LETTER
[facility]  [address] [facility] [address]  Phone: [phone]        September 6, 2022      Jeannie [address]  [facility] [address]            Dear Ms. Barger,    We are concerned about your health care.  We recently provided you with a medication refill.  Many medications require a  6 month  follow-up .  At this time we ask that: You schedule an office visit with your physician to follow your health and medications     Your prescription: Has been refilled for 1 time so you may have time for the above noted follow-up.      Thank you,      [facility]

## 2022-09-02 NOTE — TELEPHONE ENCOUNTER
Atenolol-Chlorthalidone 100-25 mg tab  Routing refill request to provider for review/approval because:  Blood Pressure out of range for [facility] refill protocol.    BP Readings from Last 3 Encounters:   03/17/22 (!) 177/86   10/11/21 137/72   05/17/21 (!) 156/83       LOV 03/17/22

## 2022-09-06 RX ORDER — ATENOLOL AND CHLORTHALIDONE TABLET 100; 25 MG/1; MG/1
TABLET ORAL
Qty: 90 TABLET | Refills: 0 | Status: SHIPPED | OUTPATIENT
Start: 2022-09-06 | End: 2022-09-08

## 2022-09-08 ENCOUNTER — OFFICE VISIT (OUTPATIENT)
Dept: SURGERY | Facility: CLINIC | Age: 62
End: 2022-09-08
Attending: NURSE PRACTITIONER
Payer: COMMERCIAL

## 2022-09-08 VITALS
BODY MASS INDEX: 48.61 KG/M2 | OXYGEN SATURATION: 96 % | HEART RATE: 62 BPM | SYSTOLIC BLOOD PRESSURE: 142 MMHG | HEIGHT: 60 IN | WEIGHT: 247.6 LBS | DIASTOLIC BLOOD PRESSURE: 92 MMHG

## 2022-09-08 DIAGNOSIS — R06.83 SNORING: ICD-10-CM

## 2022-09-08 DIAGNOSIS — R73.09 ELEVATED GLUCOSE: ICD-10-CM

## 2022-09-08 DIAGNOSIS — I10 BENIGN ESSENTIAL HYPERTENSION: ICD-10-CM

## 2022-09-08 DIAGNOSIS — E66.01 MORBID OBESITY (H): Primary | ICD-10-CM

## 2022-09-08 DIAGNOSIS — R53.83 FATIGUE, UNSPECIFIED TYPE: ICD-10-CM

## 2022-09-08 DIAGNOSIS — Z87.891 PERSONAL HISTORY OF TOBACCO USE, PRESENTING HAZARDS TO HEALTH: ICD-10-CM

## 2022-09-08 DIAGNOSIS — E66.01 MORBID OBESITY (H): ICD-10-CM

## 2022-09-08 PROCEDURE — 99205 OFFICE O/P NEW HI 60 MIN: CPT | Performed by: FAMILY MEDICINE

## 2022-09-08 RX ORDER — ATENOLOL 25 MG/1
25 TABLET ORAL DAILY
COMMUNITY
End: 2022-11-02

## 2022-09-08 RX ORDER — LIRAGLUTIDE 6 MG/ML
INJECTION, SOLUTION SUBCUTANEOUS
Qty: 30 ML | Refills: 0 | Status: SHIPPED | OUTPATIENT
Start: 2022-09-08 | End: 2023-12-12

## 2022-09-08 RX ORDER — METFORMIN HCL 500 MG
TABLET, EXTENDED RELEASE 24 HR ORAL
Qty: 180 TABLET | Refills: 0 | Status: SHIPPED | OUTPATIENT
Start: 2022-09-08 | End: 2022-12-30

## 2022-09-08 NOTE — PROGRESS NOTES
HPI:  Jeannie Barger is a 62 year old year old female who I was asked to see by Palma Mello NP for medical bariatric consultation. Weight related co-morbidities include   Patient Active Problem List   Diagnosis     Asymptomatic varicose veins     Other kyphoscoliosis and scoliosis     Obesity     Personal history of tobacco use, presenting hazards to health     Numbness in toe     Morbid obesity (H)     Benign essential hypertension   .  Her BMI is Body mass index is 49.17 kg/m ..      Assessment/ Plan:  Jeannie is a 62 year old year old female who presents for medical weight management.     Diagnosis Comments   1. Morbid obesity (H)  We discussed healthy habits to assist with weight loss. She will work on planning meals ahead of time using the plate method for portion control and macronutrient proportions. She will try keeping a food journal. We discussed medication that may assist with weight loss. Metformin and saxenda were prescribed. Risks/ benefits and possible side effects were discussed and questions were answered. Written information was given. I referred her for a sleep study. A1C was orderd Risks/ benefits and possible side effects were discussed and questions were answered. Written information was given.      2. Benign essential hypertension  Currently not controlled. We could consider adding phentermine if this is controlled in the future as she did well on it in the past.   3. Personal history of tobacco use, presenting hazards to health  Patient is aware that she should quit.         Follow up: in 2-3 months with myself, next week with the dietician      Total time spent on the date of this encounter doing: chart review, review of test results, patient visit, physical exam, education, counseling, developing plan of care and documenting = 80 minutes.     RICARDO Gonsales MD  [facility]          Counseling:  We discussed [facility] Bariatric Basics including:  -eating 3 meals  daily  -eating protein first  -eating slowly, chewing food well  -avoiding/limiting calorie containing beverages  -choosing wheat, not white with breads, crackers, pastas, anentta, bagels, tortillas, rice  -limiting carbohydrates in general  -limiting restaurant or cafeteria eating to twice a week or less    We discussed the importance of restorative sleep and stress management in maintaining a healthy weight.    We reviewed medications associated with weight gain.    We discussed insulin resistance and glycemic index as it relates to appetite and weight control.     We discussed the National Weight Control Registry healthy weight maintenance strategies and ways to optimize metabolism.  We discussed the importance of physical activity including cardiovascular and strength training in maintaining a healthier weight and explored viable options.        History Surrouding Consultation:  She has had several past supervised and unsupervised weight loss attempts  Unfortunately there was not durable weight maintenance.  History of bulimia, anorexia, or binge eating disorder? no  If Present has eating disorder been in remission at least 3 years? na    Patient perceived barriers to weight loss: poor sleep, irregular work hours- doesn't get enough sleep at night and naps during the day. She feels anxious when she feels any amount of hunger.     Patient states she did well on phentermine in the past. She is interested in trying this or other medication to help with weight loss.     Dietary History    Who does the grocery shopping?   Who does the cooking? patient  A typical meal includes: B: eggs and toast and sausage and meredith and fried potatoes or Georgian toast or donuts- when she gets home from first shift from work (9:00 am) L: snacks on chips or crackers or cheese  D (6:00): fast food- chinese, fried chicken, burger and fries  Then second diner at 10:00 pm: meat and potatoes and vegetables  Sugared beverages: none.Drinks  3-4 cans of diet coke per day. Drinks 6 mugs per day.  Caffeine: see above  Amount of restaurant eating per week: at least 5      Physical Activity Patterns  Current physical activity routine includes: none    Limitations from being physically active on a regular basis includes: fatigue, knee pain        PMH:No past medical history on file.    Past Surgical Hx:  Past Surgical History:   Procedure Laterality Date     HC LAPAROSCOPY, SURGICAL; CHOLECYSTECTOMY      Cholecystectomy, Laparoscopic     HC REVISE MEDIAN N/CARPAL TUNNEL SURG       ZZC  DELIVERY ONLY      , Low Cervical x 2     ZZ LIGATE FALLOPIAN TUBE,POSTPARTUM      Tubal Ligation     [facility] NONSPECIFIC PROCEDURE      Removal of cart. in l knee     [facility] TREAT ECTOPIC PREG,NON REMVAL      Tube & Ectopic Preg., Removal       Medication:  Current Outpatient Medications   Medication     atenolol (TENORMIN) 25 MG tablet     hydrOXYzine (ATARAX) 25 MG tablet     sertraline (ZOLOFT) 100 MG tablet     TIADYLT  MG 24 hr ER beaded capsule     No current facility-administered medications for this visit.       Allergies:Patient has no known allergies.    Family Hx:  Family History   Problem Relation Age of Onset     Diabetes Mother      Hypertension Mother      Aneurysm Mother         brain     Hypertension Father      Diabetes Type 2  Father      Peripheral Vascular Disease Father         fem pop bypass     Hypertension Sister      Multiple Sclerosis Brother      Heart Disease Paternal Grandmother         mid 60 --MI x 6     Cancer No family hx of      Alzheimer Disease No family hx of        Social Hx:  Social History     Socioeconomic History     Marital status:      Spouse name: Not on file     Number of children: Not on file     Years of education: Not on file     Highest education level: Not on file   Occupational History     Not on file   Tobacco Use     Smoking status: Former Smoker     Packs/day: 0.75     Years: 20.00  "    Pack years: 15.00     Types: Cigarettes     Smokeless tobacco: Never Used   Vaping Use     Vaping Use: Never used   Substance and Sexual Activity     Alcohol use: No     Drug use: No     Sexual activity: Yes     Partners: Male   Other Topics Concern     Not on file   Social History Narrative     Not on file     Social Determinants of Health     Financial Resource Strain: Not on file   Food Insecurity: Not on file   Transportation Needs: Not on file   Physical Activity: Not on file   Stress: Not on file   Social Connections: Not on file   Intimate Partner Violence: Not on file   Housing Stability: Not on file       Tobacco Use Hx:  History   Smoking Status     Former Smoker- quit May 2022 but now is smoking 4 cigarettes     Packs/day: 0.75     Years: 20.00     Types: Cigarettes   Smokeless Tobacco     Never Used       ROS  General  Fatigue: yes  Sleep Quality:poor  HEENT  Hx of glaucoma: no  Vision changes: no  Cardiovascular  Chest Pain with Exertion: no  Palpitations: in the past- none for a few years  Hx of heart disease: no  Pulmonary  Shortness of breath at rest: no  Shortness of breath with exertion: yes  Snoring: yes  Stop-bang score: 6  Mascot Score: 6  Psychiatric  Moods Stable: yes  Endocrine  Polydipsia: no  Personal or family history of medullary thyroid cancer: no  Neurologic:  Hx of seizures: no    History of kidney stones: no  History of kidney disease: no  Current birth control: TL    BP (!) 142/92   Pulse 62   Ht 4' 11.5\" (1.511 m)   Wt 247 lb 9.6 oz (112.3 kg)   LMP  (LMP Unknown)   SpO2 96%   BMI 49.17 kg/m    Wt Readings from Last 2 Encounters:   09/08/22 247 lb 9.6 oz (112.3 kg)   03/17/22 250 lb (113.4 kg)     Body mass index is 49.17 kg/m .    BP Readings from Last 3 Encounters:   09/08/22 (!) 142/92   03/17/22 (!) 177/86   10/11/21 137/72       Physical Exam:    GEN: Alert and oriented in no acute distress. .   LUNGS: CTA without wheezes or crackles, good air movement " throughout  CV: RRR no MRG  ABDOMEN: moderate protuberance  SKIN: no rashes, no skin tags, no acanthosis nigrans  EXTREMITIES: trace edema    Labs:    Lab Results   Component Value Date    WBC 7.6 03/17/2022    HGB 16.7 (H) 03/17/2022    HCT 52.1 (H) 03/17/2022    MCV 93 03/17/2022     03/17/2022     Lab Results   Component Value Date    CHOL 210 (H) 03/17/2022    CHOL 203 (H) 05/17/2021    CHOL 197 10/16/2002     Lab Results   Component Value Date    HDL 41 (L) 03/17/2022    HDL 46 (L) 05/17/2021    HDL 48 10/16/2002     No components found for: LDLCALC  Lab Results   Component Value Date    TRIG 141 03/17/2022    TRIG 121 05/17/2021    TRIG 79 10/16/2002     No results found for: CHOLHDL  Lab Results   Component Value Date    ALT 27 03/17/2022    AST 16 03/17/2022    ALKPHOS 80 03/17/2022     No results found for: HGBA1C  No components found for: [id number]

## 2022-09-08 NOTE — PATIENT INSTRUCTIONS
Eat Better ? Move More ? Live Well    Eat 3 nutrient-rich meals each day    Don t skip meals--it will cause you to overeat later in the day!    Eating fiber (vegetables/fruits/whole grains) and protein with meals helps you stay full longer    Choose foods with less than 10 grams of sugar and 5 grams of fat per serving to prevent excess calories and weight re-gain  Eat around the same times each day to develop a routine eating schedule   Avoid snacking unless physically hungry.   Planned snacks: 1-2 times per day and no more than 150 calories    Eat protein first   Protein helps with healing, maintaining adequate muscle mass, reducing hunger and optimizing nutritional status   Aim for 60-80 grams of protein per day   Fill up on Fiber   Fiber comes from plants--fruits, veggies, whole grains, nuts/seeds and beans   Fiber is low in calories, high in phytonutrients and helps you stay full longer   Aim for 25-35 grams per day by eating fiber with meals and snacks  Eat [id number]   Take 20-30 minutes to eat each meal by taking small bites, chewing foods to applesauce consistency or 20-30 times before you swallow   Eating foods too fast can delay satiety/fullness signals and increase overeating   Slow down your eating by using toddler utensils, putting your fork/spoon down between bites and not watching TV or emailing during meals!   Keep a Journal         Writing down what you eat, how you feel and when you are active helps you identify new changes to work on from week to week         Look for ways to cut 100 calories from your current diet 2-3 times per day  Drink 64 ounces of 0-Calorie drinks between meals   Water   Zero calorie Propel  or Vitamin Water     [employer] [place]  Zero Calories   Crystal Light , Sugar-Free Adam-Aid , and other sugar-free lemonade or flavored mills   Keep Caffeine to less than 300mg per day ie: 3-6oz cups coffee     Work up to 45-60 minutes of physical activity most days of the week   Helps  with losing weight and prevent regaining those extra pounds!    Do a combo of cardio (walking/water exercises) and strength training (lifting weights/Vinyasa yoga)    Avoid Mindless Eating   Be present when you eat--take note of the smell, taste and quality of your food   Make a list of alternative activities you could do to prevent eating out of boredom/stress  Go for a walk, call a friend, chew gum, paint your nails, re-organize the garage, etc      LEAN PROTEIN SOURCES      Protein Source Portion Calories Grams of Protein                           Nonfat, plain Greek yogurt    (10 grams sugar or less) 3/4 cup (6 oz)  12-17   Light Yogurt (10 grams sugar or less) 3/4 cup (6 oz)  6-8   Protein Shake 1 shake 110-180 15-30   Skim/1% Milk or lactose-free milk 1 cup ( 8 oz)  8   Plain or light, flavored soymilk 1 cup  7-8   Plain or light, hemp milk 1 cup 110 6   Fat Free or 1% Cottage Cheese 1/2 cup 90 15   Part skim ricotta cheese 1/2 cup 100 14   Part skim or reduced fat cheese slices 1 ounce 65-80 8     Mozzarella String Cheese 1 80 8   Canned tuna, chicken, crab or salmon  (canned in water)  1/2 cup 100 15-20   White fish (broiled, grilled, baked) 3 ounces 100 21   [place]/Tuna (broiled, grilled, baked) 3 ounces 150-180 21   Shrimp, Scallops, Lobster, Crab 3 ounces 100 21   Pork loin, Pork Tenderloin 3 ounces 150 21   Boneless, skinless chicken /turkey breast                          (broiled, grilled, baked) 3 ounces 120 21   [place], [place], [place], and Venison 3 ounces 120 21   Lean cuts of red meat and pork (sirloin,   round, tenderloin, flank, ground 93%-96%) 3 ounces 170 21   Lean or Extra Lean Ground Turkey 1/2 cup 150 20   90-95% Lean [place] Burger 1 carina 140-180 21   Low-fat casserole with lean meat 3/4 cup 200 17   Luncheon Meats                                                        (turkey, lean ham, roast beef, chicken) 3 ounces 100 21   Egg (boiled, poached, scrambled) 1 Egg 60 7    Egg Substitute 1/2 cup 70 10   Nuts (limit to 1 serving per day)  3 Tbsp. 150 7   Nut [place] (peanut, almond)  Limit to 1 serving or less daily 1 Tbsp. 90 4   Soy Burger (varies) 1  15   Garbanzo, Black, [place] Beans 1/2 cup 110 7   Refried Beans 1/2 cup 100 7   Kidney and Lima beans 1/2 cup 110 7   Tempeh 3 oz 175 18   Vegan crumbles 1/2 cup 100 14   Tofu 1/2 cup 110 14   Chili (beans and extra lean beef or turkey) 1 cup 200 23   Lentil Stew/Soup 1 cup 150 12   Black Bean Soup 1 cup 175 12

## 2022-09-15 ENCOUNTER — OFFICE VISIT (OUTPATIENT)
Dept: SURGERY | Facility: CLINIC | Age: 62
End: 2022-09-15
Attending: NURSE PRACTITIONER
Payer: COMMERCIAL

## 2022-09-15 PROCEDURE — 97802 MEDICAL NUTRITION INDIV IN: CPT | Performed by: DIETITIAN, REGISTERED

## 2022-09-15 NOTE — PROGRESS NOTES
"New Bariatric Nutrition Consultation Note    Reason For Visit: Nutrition Assessment    Jeannie Barger is a 62 year old presenting today for new bariatric nutrition consult.  Patient is accompanied by self.    ANTHROPOMETRICS:  Estimated body mass index is 49.17 kg/m  as calculated from the following:    Height as of 9/8/22: 1.511 m (4' 11.5\").    Weight as of 9/8/22: 112.3 kg (247 lb 9.6 oz).    NUTRITION HISTORY:  Who does the grocery shopping?   Who does the cooking? 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  Patient wakes at 5 AM.  Patient drinks coffee throughout the day.  5-9 drives bus   After route will stop at [employer]'s: (2 Noe's breakfast sandwich english muffin with sausage + cookies + black coffee) or [place]   If at home and cooks-will make eggs and tortilla or eggs and beans (9:00-10 AM)   Patient sleeps after first shift of work   2:30-6:00 second shift and will eat after shift (Chinese or [employer] chicken strips or Chipotle burrioto or burrito bowl [employer] pizza -cheese (thin crust) + tossed salad with blue cheese and Azeri dressing or burger and fries    Home 9:30-10:00 Salad and turkey and dinner; meatloaf or spaghetti ( may bring home for dinner or patient will meet  out for dinner)   Sugared beverages: none.Drinks 3-4 cans of diet coke per day. Drinks 6 mugs coffee per day.  Caffeine: see above  Amount of restaurant eating per week: at least 5 (fast food 1 time per day)    Likes fruit and vegetables     Patient wants to be able to sleep better and determine foods when dining out.  Patient tends to go long hours between meals and then describes getting hangry so will stop at fast food on the way home.  Patient eats past point of full to feel comfort.  Patient is a quick eater and consumes large portions.     Patient states has lost weight following Atkins diet and limiting carbohydrates " to 10-15 grams per day.     MEDICATION:  Metformin (hasn't started taking it)     NUTRITION DIAGNOSIS:  Obesity r/t long history of self-monitoring deficit and excessive energy intake aeb BMI >30 kg/m2.    INTERVENTION:  Intervention Provided/Education Discussed current intake.  Reviewed carbohydrate portion and discussed MyPlate planner.  Provided Count Your Carbohydrates handout.  Discussed fullness range as patient eats until too full.  Provided pt with list of goals.      Patient Understanding: good  Expected Patient Engagement: fair-good    GOALS:  5:30 AM protein shake or 1/2 cup cottage cheese or 2 hard boiled eggs with 1/2 cup pineapple  9:30 1 breakfast sandwich  2:30 bring turkey sandwich + fruit + carrots on bus  6:30 reduce portion sizes by 25%  9:30 snack (salad)     Follow-Up:   Recommend patient follow up in 1 month.     Time spent with patient: 36 minutes    Mal Neal, RD, LD  [facility]   [phone] (office phone)

## 2022-11-01 NOTE — PROGRESS NOTES
Jeannie Barger is a 62-year-old female evaluated by me on 7/6/2022 for symptomatic left leg varicosities.  She has since been utilizing knee-high compression stockings of 20-30 mmHg pressure.  She returns today for a left leg venous competency study.  She has been utilizing knee-high compression stockings of 20-30 mmHg pressure with some relief of her symptoms.  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  At today's visit she had no new complaints.    Exam:  I performed a limited repeat exam today.  She has a small cluster of varicosities on the medial aspect of her mid left calf with an area of eczematous changes on the mid lateral left lower leg.  She has easily palpable pedal pulses bilaterally.    Imaging:  Results of left leg venous competency study today:    FINAL SUMMARY:  1.  No left leg DVT.  Incompetence of the left common femoral vein but the remainder of the left leg deep venous system is competent.     2.  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  That vein measures 7.2 mm in diameter in the proximal thigh and 10.6 mm in diameter at the knee.  It gives rise to  varicose branches coursing across the shin.  The largest branch measures 4.7 mm in diameter.     3.  No other left leg superficial venous incompetence.  She does have a 3 mm varicosity coursing posteriorly down the leg likely arising from a pelvic source near the buttock.        Incompetence Criteria: Greater than 500 milliseconds reflux in the superficial and  veins and greater than 1000 milliseconds reflux in the deep veins           ASSESSMENT:  1.  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  That vein measures 7.2 mm in diameter in the proximal thigh and 10.6 mm in diameter at the knee.  It gives rise to varicose branches coursing  across the shin.  The largest branch measures 4.7 mm in diameter.    RECOMMENDATION:  I reviewed all the above with Jeannie.  Anatomically she would be an excellent candidate for radiofrequency ablation of the left greater saphenous vein plus or minus cosmetic left leg stab avulsion phlebectomies.  She is very busy in her professional life.  For now she plans to continue utilizing the stockings.  I discussed the specifics of radiofrequency ablation of the left greater saphenous vein including potential complications and the anticipated postprocedural course.  For now she will continue to observe this condition, but she may contact us in the future if she wishes to pursue the above recommended treatment.    All of her questions were answered and she verbalizes full understanding and agreement with this plan.    Total length of this encounter was 30 minutes with time spent reviewing studies, answering questions, and coordinating a care plan.    Tony English MD

## 2022-11-02 ENCOUNTER — OFFICE VISIT (OUTPATIENT)
Dept: VASCULAR SURGERY | Facility: CLINIC | Age: 62
End: 2022-11-02
Attending: SURGERY
Payer: COMMERCIAL

## 2022-11-02 ENCOUNTER — NURSE TRIAGE (OUTPATIENT)
Dept: INTERNAL MEDICINE | Facility: CLINIC | Age: 62
End: 2022-11-02

## 2022-11-02 ENCOUNTER — TELEPHONE (OUTPATIENT)
Dept: INTERNAL MEDICINE | Facility: CLINIC | Age: 62
End: 2022-11-02

## 2022-11-02 ENCOUNTER — ANCILLARY PROCEDURE (OUTPATIENT)
Dept: ULTRASOUND IMAGING | Facility: CLINIC | Age: 62
End: 2022-11-02
Attending: SURGERY
Payer: COMMERCIAL

## 2022-11-02 DIAGNOSIS — I10 BENIGN ESSENTIAL HYPERTENSION: ICD-10-CM

## 2022-11-02 DIAGNOSIS — I83.812 VARICOSE VEINS OF LEFT LOWER EXTREMITY WITH PAIN: Primary | ICD-10-CM

## 2022-11-02 DIAGNOSIS — I83.812 VARICOSE VEINS OF LEFT LOWER EXTREMITY WITH PAIN: ICD-10-CM

## 2022-11-02 DIAGNOSIS — I10 ESSENTIAL HYPERTENSION: ICD-10-CM

## 2022-11-02 PROCEDURE — 99214 OFFICE O/P EST MOD 30 MIN: CPT | Performed by: SURGERY

## 2022-11-02 PROCEDURE — 93971 EXTREMITY STUDY: CPT | Mod: LT | Performed by: SURGERY

## 2022-11-02 RX ORDER — ATENOLOL 25 MG/1
25 TABLET ORAL DAILY
Qty: 90 TABLET | Refills: 1 | Status: SHIPPED | OUTPATIENT
Start: 2022-11-02 | End: 2022-11-02

## 2022-11-02 RX ORDER — DILTIAZEM HYDROCHLORIDE 180 MG/1
180 CAPSULE, EXTENDED RELEASE ORAL DAILY
Qty: 90 CAPSULE | Refills: 3 | Status: SHIPPED | OUTPATIENT
Start: 2022-11-02 | End: 2023-12-12

## 2022-11-02 RX ORDER — ATENOLOL AND CHLORTHALIDONE TABLET 100; 25 MG/1; MG/1
1 TABLET ORAL DAILY
Qty: 90 TABLET | Refills: 3 | Status: SHIPPED | OUTPATIENT
Start: 2022-11-02 | End: 2023-12-12

## 2022-11-02 RX ORDER — ATENOLOL AND CHLORTHALIDONE TABLET 100; 25 MG/1; MG/1
1 TABLET ORAL DAILY
Qty: 90 TABLET | Refills: 0 | Status: CANCELLED | OUTPATIENT
Start: 2022-11-02

## 2022-11-02 NOTE — NURSING NOTE
November 2, 2022    Vein Procedure Recommendation    Spoke with patient in clinic.    Dr. English has recommended patient to have the following vein procedure(s):     1. Left leg VNUS closure GSV      Patient is recommended to wear Thigh High compression hose following her procedure. Discussed compression hose.     After Visit Follow Up  Per pt request, faxed their compression hose Rx to [facility] at [phone].   Pt would like 1 pair(s) of beige, open-toe, thigh high, 20-30mmhg compression hose. Fax confirmed.    Pt aware they will be shipped to their home address.    Yobani Mesa RN    Entered in patient's After Visit Summary (viewable in [email or web address]) written procedure instructions to review on her own (see After Visit Summary).      Next steps:    Preop Checklist:  Anticoagulant/ASA: no  Artificial joints/heart valve: no  Open Ulcer: no  Sedation nausea: no        Insurance Submission  Informed patient this process could take up to 14 business days, but once approved, the patient will be contacted by our surgery scheduler to schedule the above procedure. Gave patient our surgery scheduler's information.    Patient is in agreement with all of the above and has no further questions at this time.    Yobani Mesa RN  [facility]  Vein Clinic

## 2022-11-02 NOTE — PATIENT INSTRUCTIONS
Pre-Procedure Instructions:                                        VNUS Closure  You are having a VNUS Closure. One or more of your veins will be closed with radiofrequency heating.    Insurance  Precertification and/or referral authorization may be required by your insurance company.  We will call your insurance company to verify benefits for the medically necessary part of your procedure.    Your Current Medications and Allergies  Are you on blood thinner medications? (Aspirin, Plavix, Coumadin, Eliquis, Xarelto) Please discuss this with your surgeon.  Are you sensitive to latex or adhesives used for fake fingernails? Please let us know!     Driving Escort and   Please arrange to have a trusted adult (18 years old or older) drive you to and from the clinic.  For your safety, we recommend you have a trusted adult to stay with you until the next morning.    Your Health  If you have a change in your health before the procedure, contact our office immediately.  (For example: cold symptoms, cough, urinary tract infection, fever, flu symptoms).  A pre-procedure physical is not required.    Note  It is sometimes necessary to adjust the procedure schedule due to emergencies. We greatly appreciate your flexibility and understanding in this matter.  Compression hose are needed following this procedure.  __________________________________________________________________________________    Check List:  The Morning of Your Procedure  ___1.  Please do not apply anything on your leg(s) or shave the day of your procedure.  ___2.  You may take your normal medications the day of your procedure.  ___3.  It is recommended you eat a light breakfast or lunch on the day of your procedure.  ___4.  Wear comfortable loose-fitting clothing and wide-fitting shoes (i.e. tennis shoes, slip-ons).  ___5.  Please arrive at our clinic at the specified time given by the nurse.  ___6.  You will sign an affirmation of informed  consent.  ___7.  Bring your pre-procedure sedation medication (lorazepam and clonidine) with you to the clinic. One hour              before your procedure, you will be instructed to take these medications. The lorazepam (Ativan) lowers              anxiety and sedates you; the clonidine makes the lorazepam more effective. Everyone's body processes              these medications differently. Therefore, reactions to these medications vary. Some people stay awake and              some people sleep through the whole procedure. You may not remember everything about the procedure              or the day. You do not want to make any big decisions for the rest of the day.  ___8.  Bring the appropriate compression hose (i.e.thigh high).           The Day of Your Procedure:                  VNUS Closure  In the Exam Room  A nurse will bring you back to an exam room with your family member or friend. This is when your informed consent will be signed, and you will take your pre-procedure medications.  You will be asked to remove everything from the waist down, including undergarments. You will then put on a hospital gown or shorts and blue booties.  Your surgeon will come in to answer any questions and anam the appropriate leg(s) with a marker.  You will be taken to the restroom to empty your bladder before going into the procedure room.    In the Procedure Room  You will be escorted to the procedure room. You will lie on a procedure table covered with a sheet or blanket.  A nurse will put a blood pressure cuff on your arm and a pulse/oxygen monitor on a finger. Your vital signs will be monitored every 15 minutes.  Your gown will be pulled up slightly and the groin exposed for a short period of time. The surgeon's assistant will clean your foot, leg, and groin with an antibacterial solution. We will get you covered up as quickly as possible!  Sterile towels and blue drapes will be used to cover you and the table. You will be  asked to keep your hands under the blue drapes during the procedure.    The Procedure  The surgeon will visualize your veins with an ultrasound machine. He or she will then numb your skin and access the vein. A catheter is passed up the vein and positioned with ultrasound guidance. The table will then be tipped head down.  Once the catheter is in the correct position, medication will be injected to numb your leg. You will feel some needle sticks and may feel discomfort as the medication goes in. Once this is done, you should not experience significant discomfort. But if you do, please let us know and more numbing medication can be injected. As the catheter sends out heat, the vein closes off and the catheter is withdrawn.        Post-Procedure  Once the procedure is done, your leg will be washed with warm water and dried. You will have one or more small bandages covering your incisions. Your compression hose will be put on or an ACE wrap from your toes to groin. If the ACE wrap feels too tight or binds, please elevate your leg and loosen it.  You will be offered something to drink and a light snack.  You will rest with your leg(s) elevated for approximately 30 minutes. Your friend or family member may join you.   For your safety, you will be accompanied to your car by a staff member.    Post-Procedure Instructions:                          VNUS Closure    Post-Op Day Zero - The Day of Your Procedure:  1. Medication for Pain Control and Inflammation Control   - The numbing medication injected during your procedure will last for several hours. The pre-procedure    tablets may make you very sleepy and you might not remember everything from the procedure or from    the day. This will usually wear off by the next day.   - Ibuprofen:  If tolerated, take ibuprofen (e.g., Advil) to reduce inflammation whether or not you have    pain. For three days, take two tablets (200mg each) with every meal and at bedtime with a snack.  If    you are not able to take Ibuprofen, Tylenol is another option.   - You may resume taking any medications you were taking before your procedure.  2. Activity   - You may be up as tolerated when the sedation wears off. Elevate if possible when not walking.  3. Bandages   - You will have one or more small bandages covering the incision site(s) where we accessed the vein(s).    Keep your bandages on and dry for 48 hours. Compression hose should be worn continuously over    the bandages for the first 24 hours.  4. Incisions   - Bleeding: You may see some incision sites that are oozing through the bandages. This is not unusual    and can be managed with Rest, Ice, Compression and Elevation (RICE). Apply ice and firm pressure    directly to the site that is bleeding and rest with your leg(s) elevated above your heart for 20-30 minutes.    Post-Op Day One:  1. Medication   - Ibuprofen:  Continue the same as the Day of Your Procedure.  2. Activity   - Walk as tolerated. Resume your normal daily walking activities. If it hurts, stop. We encourage you to               walk. Elevate if possible when not walking.  3. Bandages and Compression   - After 24 hours, you may remove your compression hose to take a shower. Please keep your bandage(s)    on and intact. You may want to cover your bandage(s) to ensure it remains dry during your shower.    Reapply your compression hose after your shower and wear during waking hours only.  4. Driving   - You may resume driving when you can do so safely.    Post-Op Day Two:   1. Medication  - Ibuprofen:  Continue the same as the Day of Your Procedure.  2.  Activity   - Walk as tolerated. Elevate if possible when not walking.  3. Bandages and Compression  - You may remove your bandage after 48 hours. Continue wearing your compression hose during waking hours only for a total of seven days following your procedure.  4. Incisions   - Your leg(s) may be bruised at or near incision site(s)  and possibly have numb spots. This is normal.   5. Call Us If:   - You see any areas on your leg that are red and angry in appearance.   - You notice any drainage that is milky or cloudy in appearance or that has a foul odor.   - You run a temperature of 100.5 or greater.      Post-Op Day Three:  You will have a follow up appointment 2-4 days post-procedure. At this appointment, you will have an ultrasound and we will check your incisions.      The Two Weeks Following Your Procedure  1.  Skin Care              - Do not use any lotions, creams, or powders on your incisions for 14 days or until the incisions have healed.              - Do not soak in a bathtub, hot tub or go swimming for 14 days or until your incisions have healed.  2.  Medications   - You may use ibuprofen or acetaminophen (e.g., Tylenol) as needed for pain or discomfort.  3.  Activity   - Do not lift over 25 pounds. After about two weeks you may resume exercise such as aerobics, running,    tennis or weightlifting. Use your common sense and ease back into your exercise routine slowly.   - You may feel a cord-like tightness along the inside of your leg. Gentle stretching can be helpful.  4. Compression Hose   - Your doctor may instruct you to wear compression for longer than seven days; please    follow your doctor's instructions. As a comfort measure, you may choose to wear compression for    longer than required.  5.  Travel   - Do not fly in an airplane for 14 days after your procedure.  If you have a long car trip planned within    two to three weeks following your procedure, stop and walk for a few minutes every two hours.    Periodic ankle pumps during the ride may be helpful.    Six Week Appointment  - At your six-week appointment, you will see your surgeon for an exam and evaluation. This office visit               will be scheduled when you return for post-op day three return appointment.     Return to Work  1.  If you work outside the home,  you may return to work the next day depending on the extent of your    procedure, how you tolerate it, and the type of work you perform.  2.  Paperwork: If your employer requires paperwork or you would like a letter written to your employer, please    let us know. We will complete disability type forms at no charge. Please allow five business days for    forms to be completed.     [employer] last reviewed this educational content on 11/1/2019 2000-2021 [employer]. All rights reserved. This information is not intended as a substitute for professional medical care. Always follow your healthcare professional's instructions.

## 2022-11-02 NOTE — TELEPHONE ENCOUNTER
Pt was already triaged and instructed to go to ED/UC.     Call to pt and left message to schedule OV and that Diltiazem and Atenolol faxed to pharmacy and to call with questions.     Please make sure pt schedule appt for elevated BP.

## 2022-11-02 NOTE — TELEPHONE ENCOUNTER
Patient transferred to RN via priority line regarding a high blood pressure reading she just had at vein clinic. BP was 222/112. Patient is also dizzy. Denies any chest pain, difficulty breathing, or blurred vision.     RN advised patient she should be seen today and advised her to see an UC/ED since there are no appointments available at clinic. Patient agreed and understood.     ЕКАТЕРИНА Ashford, RN       Reason for Disposition    Systolic BP >= 200 OR Diastolic >= 120 and having NO cardiac or neurologic symptoms    Additional Information    Negative: Symptom is main concern (e.g., headache, chest pain)    Negative: Low blood pressure is main concern    Negative: Sounds like a life-threatening emergency to the triager    Negative: Systolic BP >= 160 OR Diastolic >= 100, and any cardiac or neurologic symptoms (e.g., chest pain, difficulty breathing, unsteady gait, blurred vision)    Negative: Pregnant 20 or more weeks (or postpartum < 6 weeks) with new hand or face swelling    Negative: Pregnant 20 or more weeks (or postpartum < 6 weeks) and Systolic BP >= 160 OR Diastolic >= 100    Negative: Patient sounds very sick or weak to the triager    Protocols used: BLOOD PRESSURE - HIGH-A-OH

## 2022-11-02 NOTE — TELEPHONE ENCOUNTER
Pt calls back regarding her BP medications. She states the Diltiazem dose is correct, but she takes Atenolol 100 mg with Chlorthalidone 25 mg. Pended Rx.     She is asking that this be faxed to pharmacy asap.

## 2022-11-02 NOTE — TELEPHONE ENCOUNTER
Patient calling regarding her BP medications  She states she has been taking atenolol-chlorthalidone 100-25 and is out but on her med list it was reported that she was not taking this and it was discontinued but atenolol 25 is on her list.  She states she is also taking diltiazem Er 180 mg daily but Tiadylt Er 120 is on her medication list. This was also reported on 9/8 as patient not taking and taken off medication list by vascular.    Medications verified with patient. Please advise on refills. Her BP was 222/112 at vascular today as she has been out of her medication.    Pt was also informed she needs to make an appointment and states she will do so.    Fernando SILVA RN, BSN

## 2022-11-02 NOTE — LETTER
11/2/2022         RE: Jeannie Barger  [address] [place] [address]        Dear Colleague,    Thank you for referring your patient, Jeannie Barger, to the [facility]. Please see a copy of my visit note below.    Jeannie Barger is a 62-year-old female evaluated by me on 7/6/2022 for symptomatic left leg varicosities.  She has since been utilizing knee-high compression stockings of 20-30 mmHg pressure.  She returns today for a left leg venous competency study.  She has been utilizing knee-high compression stockings of 20-30 mmHg pressure with some relief of her symptoms.  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  At today's visit she had no new complaints.    Exam:  I performed a limited repeat exam today.  She has a small cluster of varicosities on the medial aspect of her mid left calf with an area of eczematous changes on the mid lateral left lower leg.  She has easily palpable pedal pulses bilaterally.    Imaging:  Results of left leg venous competency study today:    FINAL SUMMARY:  1.  No left leg DVT.  Incompetence of the left common femoral vein but the remainder of the left leg deep venous system is competent.     2.  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  That vein measures 7.2 mm in diameter in the proximal thigh and 10.6 mm in diameter at the knee.  It gives rise to  varicose branches coursing across the shin.  The largest branch measures 4.7 mm in diameter.     3.  No other left leg superficial venous incompetence.  She does have a 3 mm varicosity coursing posteriorly down the leg likely arising from a pelvic source near the buttock.        Incompetence Criteria: Greater than 500 milliseconds reflux in the superficial and  veins and greater than 1000 milliseconds reflux in the deep veins           ASSESSMENT:  1.  Symptomatic left leg varicosities  secondary to incompetence of the entire left greater saphenous vein from the proximal thigh to the distal calf.  That vein measures 7.2 mm in diameter in the proximal thigh and 10.6 mm in diameter at the knee.  It gives rise to varicose branches coursing across the shin.  The largest branch measures 4.7 mm in diameter.    RECOMMENDATION:  I reviewed all the above with Jeannie.  Anatomically she would be an excellent candidate for radiofrequency ablation of the left greater saphenous vein plus or minus cosmetic left leg stab avulsion phlebectomies.  She is very busy in her professional life.  For now she plans to continue utilizing the stockings.  I discussed the specifics of radiofrequency ablation of the left greater saphenous vein including potential complications and the anticipated postprocedural course.  For now she will continue to observe this condition, but she may contact us in the future if she wishes to pursue the above recommended treatment.    All of her questions were answered and she verbalizes full understanding and agreement with this plan.    Total length of this encounter was 30 minutes with time spent reviewing studies, answering questions, and coordinating a care plan.    Tony English MD      Again, thank you for allowing me to participate in the care of your patient.        Sincerely,        Jac English MD

## 2022-11-28 ENCOUNTER — OFFICE VISIT (OUTPATIENT)
Dept: URGENT CARE | Facility: URGENT CARE | Age: 62
End: 2022-11-28
Payer: COMMERCIAL

## 2022-11-28 VITALS
SYSTOLIC BLOOD PRESSURE: 166 MMHG | DIASTOLIC BLOOD PRESSURE: 105 MMHG | TEMPERATURE: 97.5 F | OXYGEN SATURATION: 92 % | HEART RATE: 92 BPM

## 2022-11-28 DIAGNOSIS — R05.8 PRODUCTIVE COUGH: Primary | ICD-10-CM

## 2022-11-28 PROCEDURE — 99213 OFFICE O/P EST LOW 20 MIN: CPT | Performed by: FAMILY MEDICINE

## 2022-11-28 RX ORDER — AZITHROMYCIN 250 MG/1
TABLET, FILM COATED ORAL
Qty: 6 TABLET | Refills: 0 | Status: SHIPPED | OUTPATIENT
Start: 2022-11-28 | End: 2022-12-03

## 2022-11-29 NOTE — PROGRESS NOTES
SUBJECTIVE: Jeannie Barger is a 62 year old female presenting with a chief complaint of cough .  Onset of symptoms was 10 day(s) ago.  Predisposing factors include None.  2 neg covid tests at home    History reviewed. No pertinent past medical history.  No Known Allergies  Social History     Tobacco Use     Smoking status: Former     Packs/day: 0.75     Years: 20.00     Pack years: 15.00     Types: Cigarettes     Smokeless tobacco: Never   Substance Use Topics     Alcohol use: No       ROS:  SKIN: no rash  GI: no vomiting    OBJECTIVE:  BP (!) 166/105 (BP Location: Left arm, Patient Position: Sitting, Cuff Size: Adult Large)   Pulse 92   Temp 97.5  F (36.4  C) (Tympanic)   LMP  (LMP Unknown)   SpO2 92%   Breastfeeding No GENERAL APPEARANCE: healthy, alert and no distress  EYES: EOMI,  PERRL, conjunctiva clear  HENT: ear canals and TM's normal.  Nose and mouth without ulcers, erythema or lesions  RESP: lungs clear to auscultation - no rales, rhonchi or wheezes  SKIN: no suspicious lesions or rashes      ICD-10-CM    1. Productive cough  R05.8 azithromycin (ZITHROMAX) 250 MG tablet          Fluids/Rest, f/u if worse/not any better

## 2022-12-27 ENCOUNTER — TELEPHONE (OUTPATIENT)
Dept: INTERNAL MEDICINE | Facility: CLINIC | Age: 62
End: 2022-12-27

## 2022-12-27 NOTE — TELEPHONE ENCOUNTER
Reason for call:  Other   Patient called regarding (reason for call): appointment and call back  Additional comments: Per patient: Is there anyway I can be placed on a cancellation list for an appt this week, I found a lump on my right breast and I'm concerned    Phone number to reach patient:  Cell number on file:    Telephone Information:   Mobile [phone]       Best Time:  ANYTIME    Can we leave a detailed message on this number?  YES    Travel screening: Not Applicable

## 2022-12-28 ENCOUNTER — TELEPHONE (OUTPATIENT)
Dept: SLEEP MEDICINE | Facility: CLINIC | Age: 62
End: 2022-12-28

## 2022-12-28 NOTE — TELEPHONE ENCOUNTER
Left a message informing patient of appt type change, provider in [facility] only on 1/12/23. Left CS phone to reschedule is [facility] does not work.

## 2022-12-30 ENCOUNTER — OFFICE VISIT (OUTPATIENT)
Dept: INTERNAL MEDICINE | Facility: CLINIC | Age: 62
End: 2022-12-30
Payer: COMMERCIAL

## 2022-12-30 VITALS
SYSTOLIC BLOOD PRESSURE: 134 MMHG | OXYGEN SATURATION: 98 % | RESPIRATION RATE: 20 BRPM | WEIGHT: 244 LBS | TEMPERATURE: 98.1 F | HEIGHT: 59 IN | BODY MASS INDEX: 49.19 KG/M2 | HEART RATE: 63 BPM | DIASTOLIC BLOOD PRESSURE: 74 MMHG

## 2022-12-30 DIAGNOSIS — N63.22 MASS OF UPPER INNER QUADRANT OF LEFT BREAST: Primary | ICD-10-CM

## 2022-12-30 DIAGNOSIS — F41.9 ANXIETY: ICD-10-CM

## 2022-12-30 DIAGNOSIS — E66.01 MORBID OBESITY (H): ICD-10-CM

## 2022-12-30 PROCEDURE — 99213 OFFICE O/P EST LOW 20 MIN: CPT | Performed by: INTERNAL MEDICINE

## 2022-12-30 RX ORDER — HYDROXYZINE HYDROCHLORIDE 25 MG/1
25 TABLET, FILM COATED ORAL EVERY 8 HOURS PRN
Qty: 40 TABLET | Refills: 1 | Status: SHIPPED | OUTPATIENT
Start: 2022-12-30 | End: 2023-12-12

## 2022-12-30 RX ORDER — METFORMIN HCL 500 MG
TABLET, EXTENDED RELEASE 24 HR ORAL
Qty: 180 TABLET | Refills: 1 | Status: SHIPPED | OUTPATIENT
Start: 2022-12-30 | End: 2023-12-12

## 2022-12-30 NOTE — PROGRESS NOTES
Assessment & Plan     Mass of upper inner quadrant of left breast  She has a new nodule of the left breast, characteristics appear to be benign but since it is new recommend diagnostic study.  - MA Diagnostic Digital Left; Future  - US Breast Left Limited 1-3 Quadrants; Future        No follow-ups on file.    Sadia Siu MD  [facility] WILLIE Dennis is a 62 year old accompanied by her spouse, presenting for the following health issues:  Breast Mass  Lump left medial breast: She reports that 3 days ago she noticed a lump in the left medial breast near the breastbone.  She does happen to feel it doing a self breast check, it is not painful.  She is not aware of any change since she first noticed it.  She previously had to be called back for repeat diagnostic mammogram and ultrasounds involving the left breast though it no masses were found.  She tends to get an fair number of skin nodules consistent with lipomas of her chest wall below the breasts but this is definitely a new nodule.      Patient Active Problem List   Diagnosis     Asymptomatic varicose veins     Other kyphoscoliosis and scoliosis     Obesity     Personal history of tobacco use, presenting hazards to health     Numbness in toe     Morbid obesity (H)     Benign essential hypertension     Current Outpatient Medications   Medication Sig Dispense Refill     atenolol-chlorthalidone (TENORETIC) 100-25 MG tablet Take 1 tablet by mouth daily 90 tablet 3     diltiazem ER (DILT-XR) 180 MG 24 hr capsule Take 1 capsule (180 mg) by mouth daily 90 capsule 3     hydrOXYzine (ATARAX) 25 MG tablet Take 1 tablet (25 mg) by mouth every 8 hours as needed 40 tablet 1     metFORMIN (GLUCOPHAGE XR) 500 MG 24 hr tablet 1 tablet with dinner daily for 2 weeks then 2 tablets with dinner 180 tablet 1     insulin pen needle (31G X 6 MM) 31G X 6 MM miscellaneous Use 1 pen needle daily or as directed. (Patient not taking: Reported on  "12/30/2022) 100 each 11     SAXENDA 18 MG/3ML pen PLEASE SEE ATTACHED FOR DETAILED DIRECTIONS (Patient not taking: Reported on 12/30/2022) 30 mL 0     sertraline (ZOLOFT) 100 MG tablet TAKE 1 TABLET BY MOUTH EVERY DAY (Patient not taking: Reported on 12/30/2022) 90 tablet 0            Review of Systems   Negative      Objective    /74 (BP Location: Left arm, Patient Position: Sitting, Cuff Size: Adult Large)   Pulse 63   Temp 98.1  F (36.7  C) (Tympanic)   Resp 20   Ht 1.499 m (4' 11\")   Wt 110.7 kg (244 lb)   LMP  (LMP Unknown)   SpO2 98%   Breastfeeding No   BMI 49.28 kg/m    Body mass index is 49.28 kg/m .  Physical Exam     Right breast: No masses, no tenderness, no axillary nodes.  Left breast: There is a nodule at 9:00 about 6 fingerbreadths from the edge of the areola proximately 1 to 1.2 cm, oval, firm, mobile.  No overlying skin dimpling.  Minimally tender with firm pressure.  No axillary nodes.                  "

## 2023-01-06 ENCOUNTER — HOSPITAL ENCOUNTER (OUTPATIENT)
Dept: MAMMOGRAPHY | Facility: CLINIC | Age: 63
Discharge: HOME | End: 2023-01-06
Attending: INTERNAL MEDICINE
Payer: COMMERCIAL

## 2023-01-06 ENCOUNTER — HOSPITAL ENCOUNTER (OUTPATIENT)
Dept: ULTRASOUND IMAGING | Facility: CLINIC | Age: 63
Discharge: HOME | End: 2023-01-06
Attending: INTERNAL MEDICINE
Payer: COMMERCIAL

## 2023-01-06 DIAGNOSIS — N63.22 MASS OF UPPER INNER QUADRANT OF LEFT BREAST: ICD-10-CM

## 2023-01-06 PROCEDURE — 76642 ULTRASOUND BREAST LIMITED: CPT | Mod: LT

## 2023-01-06 PROCEDURE — 77066 DX MAMMO INCL CAD BI: CPT

## 2023-04-29 DIAGNOSIS — F41.9 ANXIETY: ICD-10-CM

## 2023-05-01 RX ORDER — SERTRALINE HYDROCHLORIDE 100 MG/1
TABLET, FILM COATED ORAL
Qty: 90 TABLET | Refills: 1 | Status: SHIPPED | OUTPATIENT
Start: 2023-05-01 | End: 2024-03-13

## 2023-05-02 NOTE — TELEPHONE ENCOUNTER
Pending Prescriptions:                       Disp   Refills    sertraline (ZOLOFT) 100 MG tablet [Pharma*90 tab*1            Sig: TAKE 1 TABLET BY MOUTH EVERY DAY    Prescription approved per [facility] Refill Protocol.

## 2023-07-10 LAB
ANION GAP SERPL CALCULATED.3IONS-SCNC: 9 MMOL/L (ref 7–15)
BASOPHILS # BLD AUTO: 0.1 10E3/UL (ref 0–0.2)
BASOPHILS NFR BLD AUTO: 1 %
BUN SERPL-MCNC: 17.9 MG/DL (ref 8–23)
CALCIUM SERPL-MCNC: 9.2 MG/DL (ref 8.8–10.2)
CHLORIDE SERPL-SCNC: 98 MMOL/L (ref 98–107)
CREAT SERPL-MCNC: 0.71 MG/DL (ref 0.51–0.95)
D DIMER PPP FEU-MCNC: 0.82 UG/ML FEU (ref 0–0.5)
DEPRECATED HCO3 PLAS-SCNC: 31 MMOL/L (ref 22–29)
EOSINOPHIL # BLD AUTO: 0.2 10E3/UL (ref 0–0.7)
EOSINOPHIL NFR BLD AUTO: 2 %
ERYTHROCYTE [DISTWIDTH] IN BLOOD BY AUTOMATED COUNT: 15.2 % (ref 10–15)
GFR SERPL CREATININE-BSD FRML MDRD: >90 ML/MIN/1.73M2
GLUCOSE SERPL-MCNC: 160 MG/DL (ref 70–99)
HCT VFR BLD AUTO: 49 % (ref 35–47)
HGB BLD-MCNC: 15.9 G/DL (ref 11.7–15.7)
HOLD SPECIMEN: NORMAL
HOLD SPECIMEN: NORMAL
IMM GRANULOCYTES # BLD: 0.1 10E3/UL
IMM GRANULOCYTES NFR BLD: 1 %
LYMPHOCYTES # BLD AUTO: 2.5 10E3/UL (ref 0.8–5.3)
LYMPHOCYTES NFR BLD AUTO: 25 %
MCH RBC QN AUTO: 30.2 PG (ref 26.5–33)
MCHC RBC AUTO-ENTMCNC: 32.4 G/DL (ref 31.5–36.5)
MCV RBC AUTO: 93 FL (ref 78–100)
MONOCYTES # BLD AUTO: 0.7 10E3/UL (ref 0–1.3)
MONOCYTES NFR BLD AUTO: 7 %
NEUTROPHILS # BLD AUTO: 6.6 10E3/UL (ref 1.6–8.3)
NEUTROPHILS NFR BLD AUTO: 64 %
NRBC # BLD AUTO: 0 10E3/UL
NRBC BLD AUTO-RTO: 0 /100
NT-PROBNP SERPL-MCNC: 341 PG/ML (ref 0–900)
PLATELET # BLD AUTO: 306 10E3/UL (ref 150–450)
POTASSIUM SERPL-SCNC: 3.3 MMOL/L (ref 3.4–5.3)
RBC # BLD AUTO: 5.26 10E6/UL (ref 3.8–5.2)
SODIUM SERPL-SCNC: 138 MMOL/L (ref 136–145)
TROPONIN T SERPL HS-MCNC: 8 NG/L
TSH SERPL DL<=0.005 MIU/L-ACNC: 1.64 UIU/ML (ref 0.3–4.2)
WBC # BLD AUTO: 10.1 10E3/UL (ref 4–11)

## 2023-07-10 PROCEDURE — 84484 ASSAY OF TROPONIN QUANT: CPT | Performed by: EMERGENCY MEDICINE

## 2023-07-10 PROCEDURE — 83735 ASSAY OF MAGNESIUM: CPT | Performed by: EMERGENCY MEDICINE

## 2023-07-10 PROCEDURE — 84443 ASSAY THYROID STIM HORMONE: CPT | Performed by: EMERGENCY MEDICINE

## 2023-07-10 PROCEDURE — 85379 FIBRIN DEGRADATION QUANT: CPT | Performed by: EMERGENCY MEDICINE

## 2023-07-10 PROCEDURE — 36415 COLL VENOUS BLD VENIPUNCTURE: CPT | Performed by: EMERGENCY MEDICINE

## 2023-07-10 PROCEDURE — 80048 BASIC METABOLIC PNL TOTAL CA: CPT | Performed by: EMERGENCY MEDICINE

## 2023-07-10 PROCEDURE — 85025 COMPLETE CBC W/AUTO DIFF WBC: CPT | Performed by: EMERGENCY MEDICINE

## 2023-07-10 PROCEDURE — 99285 EMERGENCY DEPT VISIT HI MDM: CPT | Mod: 25

## 2023-07-10 PROCEDURE — 93005 ELECTROCARDIOGRAM TRACING: CPT

## 2023-07-10 PROCEDURE — 83880 ASSAY OF NATRIURETIC PEPTIDE: CPT | Performed by: EMERGENCY MEDICINE

## 2023-07-10 NOTE — ED TRIAGE NOTES
Intermittent midsternal CP, palpitations, worsening SOB, and dizziness x 1 week. Worse upon waking in the morning. O2 sat of 90-92% on room air. Also reports diarrhea x 2 months. C/o fatigue. ABCs intact. A/Ox4

## 2023-07-11 ENCOUNTER — APPOINTMENT (OUTPATIENT)
Dept: CT IMAGING | Facility: CLINIC | Age: 63
End: 2023-07-11
Attending: EMERGENCY MEDICINE
Payer: COMMERCIAL

## 2023-07-11 ENCOUNTER — HOSPITAL ENCOUNTER (EMERGENCY)
Facility: CLINIC | Age: 63
Discharge: HOME | End: 2023-07-11
Attending: EMERGENCY MEDICINE | Admitting: EMERGENCY MEDICINE
Payer: COMMERCIAL

## 2023-07-11 VITALS
SYSTOLIC BLOOD PRESSURE: 193 MMHG | OXYGEN SATURATION: 94 % | TEMPERATURE: 97 F | RESPIRATION RATE: 14 BRPM | HEART RATE: 50 BPM | DIASTOLIC BLOOD PRESSURE: 76 MMHG

## 2023-07-11 DIAGNOSIS — I10 UNCONTROLLED HYPERTENSION: ICD-10-CM

## 2023-07-11 DIAGNOSIS — T38.3X5A ADVERSE EFFECT OF METFORMIN, INITIAL ENCOUNTER: ICD-10-CM

## 2023-07-11 DIAGNOSIS — E87.6 HYPOKALEMIA: ICD-10-CM

## 2023-07-11 DIAGNOSIS — R19.7 DIARRHEA, UNSPECIFIED TYPE: ICD-10-CM

## 2023-07-11 DIAGNOSIS — R91.1 PULMONARY NODULE: ICD-10-CM

## 2023-07-11 DIAGNOSIS — R73.9 HYPERGLYCEMIA: ICD-10-CM

## 2023-07-11 DIAGNOSIS — R07.89 ATYPICAL CHEST PAIN: ICD-10-CM

## 2023-07-11 LAB
ATRIAL RATE - MUSE: 58 BPM
DIASTOLIC BLOOD PRESSURE - MUSE: NORMAL MMHG
INTERPRETATION ECG - MUSE: NORMAL
MAGNESIUM SERPL-MCNC: 1.8 MG/DL (ref 1.7–2.3)
P AXIS - MUSE: 31 DEGREES
PR INTERVAL - MUSE: 190 MS
QRS DURATION - MUSE: 98 MS
QT - MUSE: 448 MS
QTC - MUSE: 439 MS
R AXIS - MUSE: 20 DEGREES
SYSTOLIC BLOOD PRESSURE - MUSE: NORMAL MMHG
T AXIS - MUSE: 57 DEGREES
TROPONIN T SERPL HS-MCNC: 8 NG/L
VENTRICULAR RATE- MUSE: 58 BPM

## 2023-07-11 PROCEDURE — 96360 HYDRATION IV INFUSION INIT: CPT | Mod: 59

## 2023-07-11 PROCEDURE — 250N000009 HC RX 250: Performed by: EMERGENCY MEDICINE

## 2023-07-11 PROCEDURE — 84484 ASSAY OF TROPONIN QUANT: CPT | Performed by: EMERGENCY MEDICINE

## 2023-07-11 PROCEDURE — 71275 CT ANGIOGRAPHY CHEST: CPT

## 2023-07-11 PROCEDURE — 36415 COLL VENOUS BLD VENIPUNCTURE: CPT | Performed by: EMERGENCY MEDICINE

## 2023-07-11 PROCEDURE — 258N000003 HC RX IP 258 OP 636: Performed by: EMERGENCY MEDICINE

## 2023-07-11 PROCEDURE — 250N000011 HC RX IP 250 OP 636: Performed by: EMERGENCY MEDICINE

## 2023-07-11 PROCEDURE — 250N000013 HC RX MED GY IP 250 OP 250 PS 637: Performed by: EMERGENCY MEDICINE

## 2023-07-11 RX ORDER — IOPAMIDOL 755 MG/ML
500 INJECTION, SOLUTION INTRAVASCULAR ONCE
Status: COMPLETED | OUTPATIENT
Start: 2023-07-11 | End: 2023-07-11

## 2023-07-11 RX ORDER — POTASSIUM CHLORIDE 1500 MG/1
40 TABLET, EXTENDED RELEASE ORAL ONCE
Status: COMPLETED | OUTPATIENT
Start: 2023-07-11 | End: 2023-07-11

## 2023-07-11 RX ADMIN — SODIUM CHLORIDE 90 ML: 9 INJECTION, SOLUTION INTRAVENOUS at 01:37

## 2023-07-11 RX ADMIN — SODIUM CHLORIDE 1000 ML: 9 INJECTION, SOLUTION INTRAVENOUS at 01:51

## 2023-07-11 RX ADMIN — IOPAMIDOL 73 ML: 755 INJECTION, SOLUTION INTRAVENOUS at 01:37

## 2023-07-11 RX ADMIN — POTASSIUM CHLORIDE 40 MEQ: 1500 TABLET, EXTENDED RELEASE ORAL at 01:56

## 2023-07-11 ASSESSMENT — ACTIVITIES OF DAILY LIVING (ADL)
ADLS_ACUITY_SCORE: 33
ADLS_ACUITY_SCORE: 35

## 2023-07-11 NOTE — ED PROVIDER NOTES
History     Chief Complaint:  Palpitations     The history is provided by the patient.      Jeannie Barger is a 63 year old female smoker who presents alone with several complaints.  She describes 2 months of diarrhea, citing 7-8 episodes per day.  She initially thought it might be her Zoloft so she stopped that without any improvement.  She then thought it might be her metformin and she decreased from 1000 mg to 500 mg/day but still has diarrhea.  Over the last couple of weeks she has had intermittent palpitations as if her heart was racing with increased fatigue, headaches, and some occasional shortness of breath.  In the last week she awakes with left-sided chest pain which then resolves as she gets up and moves about throughout the day.  She is also concerned as her blood pressure has been elevated in the 180s systolic.  Ultimately she presented to the emergency department this evening because she awoke this morning and felt unwell and wanted to make sure she was not having a heart attack as she has an international trip planned.  She wonders if dehydration from her diarrhea is causing her symptoms.  She does not currently have chest pain.  She had a flight to [place] about a month ago.  She denies change in leg swelling.  She denies fever, abdominal pain, vomiting, or known sick contacts.    Independent Historian:   As above    Review of External Notes:   I reviewed notes from her last visit in 2022 for a breast nodule. Imaging was ordered    Medications:    Tenoretic  Atarax  Saxenda pen  Glucophage XR  Zoloft    Past Medical History:    Obesity  Hypertension  Tobacco use  Kyphoscoliosis and scoliosis     Past Surgical History:    Cholecystectomy, laparoscopic  Revise median n/carpal tunnel surgery   section  Tubal ligation, fallopian  Cartilage removal, knee  Tube and ectopic preg. removal     Physical Exam     Patient Vitals for the past 24 hrs:   BP Temp Temp src Pulse Resp SpO2    07/11/23 0215 -- -- -- (!) 48 11 94 %   07/11/23 0125 (!) 196/93 -- -- (!) 49 (!) 32 92 %   07/11/23 0115 (!) 195/98 -- -- 55 -- 90 %   07/10/23 1801 (!) 189/94 97  F (36.1  C) Temporal 63 20 92 %        Physical Exam  General: Well-developed and well-nourished. Well appearing middle-aged  woman. Cooperative.  Head:  Atraumatic.  Eyes:  Conjunctivae, lids, and sclerae are normal.  ENT:    Normal nose. Moist mucous membranes.  Neck:  Supple. Normal range of motion.  CV:  Regular rate and rhythm. Normal heart sounds with no murmurs, rubs, or gallops detected.  Resp:  No respiratory distress. Clear to auscultation bilaterally without decreased breath sounds, wheezing, rales, or rhonchi.  GI:  Soft. Non-distended. Non-tender.    MS:  Normal ROM. No bilateral lower extremity edema.  Skin:  Warm. Non-diaphoretic. No pallor.  Neuro:  Awake. A&Ox3. Normal strength.  Psych: Normal mood and affect. Normal speech.  Vitals reviewed.    Emergency Department Course   EKG  Indication: Palpitations  Time: 1811  Rate 58 bpm. ME interval 190. QRS duration 98. QT/QTc 448/439.   Sinus bradycardia with sinus arrhythmia  Otherwise normal ECG   No acute ST changes.  No comparison available.    Imaging:  CT Chest Pulmonary Embolism w Contrast   Final Result   IMPRESSION:   1.  No acute process.   2.  3 mm pulmonary nodule.      Recommendation:   REFERENCE:   Guidelines for Management of Incidental Pulmonary Nodules Detected on CT Images: From the Fleischner Society 2017.    Guidelines apply to incidental nodules in patients who are 35 years or older.   Guidelines do not apply to lung cancer screening, patients with immunosuppression, or patients with known primary cancer.      SINGLE NODULE   Nodule size <6 mm   Low-risk patients: No follow-up needed.   High-risk patients: Optional follow-up at 12 months.            Report per radiology    Laboratory:  Labs Ordered and Resulted from Time of ED Arrival to Time of ED Departure    BASIC METABOLIC PANEL - Abnormal       Result Value    Sodium 138      Potassium 3.3 (*)     Chloride 98      Carbon Dioxide (CO2) 31 (*)     Anion Gap 9      Urea Nitrogen 17.9      Creatinine 0.71      Calcium 9.2      Glucose 160 (*)     GFR Estimate >90     CBC WITH PLATELETS AND DIFFERENTIAL - Abnormal    WBC Count 10.1      RBC Count 5.26 (*)     Hemoglobin 15.9 (*)     Hematocrit 49.0 (*)     MCV 93      MCH 30.2      MCHC 32.4      RDW 15.2 (*)     Platelet Count 306      % Neutrophils 64      % Lymphocytes 25      % Monocytes 7      % Eosinophils 2      % Basophils 1      % Immature Granulocytes 1      NRBCs per 100 WBC 0      Absolute Neutrophils 6.6      Absolute Lymphocytes 2.5      Absolute Monocytes 0.7      Absolute Eosinophils 0.2      Absolute Basophils 0.1      Absolute Immature Granulocytes 0.1      Absolute NRBCs 0.0     D DIMER QUANTITATIVE - Abnormal    D-Dimer Quantitative 0.82 (*)    TROPONIN T, HIGH SENSITIVITY - Normal    Troponin T, High Sensitivity 8     NT PROBNP INPATIENT - Normal    N terminal Pro BNP Inpatient 341     TSH WITH FREE T4 REFLEX - Normal    TSH 1.64     TROPONIN T, HIGH SENSITIVITY - Normal    Troponin T, High Sensitivity 8     MAGNESIUM - Normal    Magnesium 1.8       Emergency Department Course & Assessments:  Interventions:  Medications   0.9% sodium chloride BOLUS (1,000 mLs Intravenous $New Bag 7/11/23 0151)   potassium chloride ER (KLOR-CON M) CR tablet 40 mEq (40 mEq Oral $Given 7/11/23 0156)      Assessments:  0114 I obtained history and examined the patient as noted above.  0258 I rechecked and updated the patient.     Independent Interpretation (X-rays, CTs, rhythm strip):  I independently interpreted the CT and see no large PE.    Consultations/Discussion of Management or Tests:  Not applicable     Social Determinants of Health affecting care:   Established primary care provider.  .    Disposition:  The patient was discharged.     Impression & Plan     Medical Decision Making:  Jeannie is a 63 year old woman who has had 2 months of diarrhea as well as a couple weeks of intermittent palpitations, fatigue, headaches, and intermittent dyspnea.  In the last week she noticed left-sided chest pain and elevated blood pressure readings which prompted her visit.  She appears well on exam though she is hypertensive.    EKG is reassuring without acute ST changes or arrhythmias.  Both initial and repeat troponin are normal.  I have low suspicion for ACS as the cause for her intermittent atypical pain.  She has had recent travel and some shortness of breath so D-dimer was obtained which was elevated.  Fortunately, CT PE study is without pulmonary embolism or other acute pathology with incidental pulmonary nodule which she was made aware.  I would recommend follow-up as she does smoke.  She does not appear volume overloaded and BNP is normal.  TSH is normal.  She has hypokalemia to 3.3 but no kidney injury or electrolyte derangement.  She has erythrocytosis to 15.9.  This may be related to her smoking (does appear to be chronic) or mild dehydration/hemoconcentration in the setting of her diarrhea.  Her diarrhea is almost certainly related to her metformin use.  We will stop this altogether and she will visit her primary care provider.  She tells me this was started just for weight loss although her glucose here is 160.  She was given IV fluids as she expressed concern for dehydration.    At this point she is appropriate for discharge after a reassuring work-up.  The exact cause of her intermittent chest pain, palpitations, and shortness of breath is unclear at this time.  I have recommended she follow-up with her primary care provider to discuss replacement for metformin which we will stop due to diarrhea, hyperglycemia, elevated blood pressure readings, need for pulmonary nodule screening in a year, and follow-up on symptoms.  I recommended she increase her dietary potassium  and drink lots of water.  Indications for return were reviewed.  All of her questions were answered and she verbalized understanding.  Amenable to discharge.    Diagnosis:    ICD-10-CM    1. Atypical chest pain  R07.89       2. Hyperglycemia  R73.9       3. Uncontrolled hypertension  I10       4. Diarrhea, unspecified type  R19.7     Metformin side effect      5. Adverse effect of metformin, initial encounter  T38.3X5A       6. Pulmonary nodule  R91.1       7. Hypokalemia  E87.6          Discharge Medications:  Discharge Medication List as of 7/11/2023  3:09 AM         Scribe Disclosure:  Hieu COLON, am serving as a scribe at 1:21 AM on 7/11/2023 to document services personally performed by Maryam Be MD based on my observations and the provider's statements to me.      Maryam Be MD  07/12/23 1500       Maryam Be MD  07/12/23 0197

## 2023-07-11 NOTE — DISCHARGE INSTRUCTIONS
Stop metformin.    Follow-up with your primary care provider regarding elevated sugar and blood pressure.  You also want to tell her that you needed to stop the metformin due to side effect of diarrhea.    Arrange follow-up for your lung nodule in 12 months with your primary care provider.    Increase dietary potassium.  Lots of water.    Return immediately for worsening or change in pain or any new concerns.

## 2023-12-12 ENCOUNTER — OFFICE VISIT (OUTPATIENT)
Dept: FAMILY MEDICINE | Facility: CLINIC | Age: 63
End: 2023-12-12
Payer: COMMERCIAL

## 2023-12-12 VITALS
RESPIRATION RATE: 18 BRPM | WEIGHT: 251 LBS | BODY MASS INDEX: 50.6 KG/M2 | OXYGEN SATURATION: 89 % | HEIGHT: 59 IN | DIASTOLIC BLOOD PRESSURE: 92 MMHG | SYSTOLIC BLOOD PRESSURE: 128 MMHG | HEART RATE: 57 BPM | TEMPERATURE: 97.4 F

## 2023-12-12 DIAGNOSIS — Z12.11 SCREEN FOR COLON CANCER: ICD-10-CM

## 2023-12-12 DIAGNOSIS — D03.30 MELANOMA IN SITU OF FACE (H): ICD-10-CM

## 2023-12-12 DIAGNOSIS — I87.2 VENOUS STASIS DERMATITIS OF LEFT LOWER EXTREMITY: ICD-10-CM

## 2023-12-12 DIAGNOSIS — I83.813 VARICOSE VEINS OF BILATERAL LOWER EXTREMITIES WITH PAIN: ICD-10-CM

## 2023-12-12 DIAGNOSIS — R19.7 DIARRHEA, UNSPECIFIED TYPE: ICD-10-CM

## 2023-12-12 DIAGNOSIS — I10 BENIGN ESSENTIAL HYPERTENSION: ICD-10-CM

## 2023-12-12 DIAGNOSIS — D17.30 LIPOMA OF SKIN AND SUBCUTANEOUS TISSUE: ICD-10-CM

## 2023-12-12 DIAGNOSIS — Z13.1 SCREENING FOR DIABETES MELLITUS: ICD-10-CM

## 2023-12-12 DIAGNOSIS — E66.01 MORBID OBESITY (H): ICD-10-CM

## 2023-12-12 DIAGNOSIS — Z13.220 SCREENING FOR LIPID DISORDERS: ICD-10-CM

## 2023-12-12 DIAGNOSIS — F41.9 ANXIETY: ICD-10-CM

## 2023-12-12 DIAGNOSIS — Z11.59 NEED FOR HEPATITIS C SCREENING TEST: ICD-10-CM

## 2023-12-12 DIAGNOSIS — Z11.4 SCREENING FOR HIV (HUMAN IMMUNODEFICIENCY VIRUS): ICD-10-CM

## 2023-12-12 DIAGNOSIS — R06.09 DYSPNEA ON EXERTION: Primary | ICD-10-CM

## 2023-12-12 DIAGNOSIS — R06.83 SNORING: ICD-10-CM

## 2023-12-12 LAB
ERYTHROCYTE [DISTWIDTH] IN BLOOD BY AUTOMATED COUNT: 14.8 % (ref 10–15)
ERYTHROCYTE [SEDIMENTATION RATE] IN BLOOD BY WESTERGREN METHOD: 8 MM/HR (ref 0–30)
HBA1C MFR BLD: 6.5 % (ref 0–5.6)
HCT VFR BLD AUTO: 52.1 % (ref 35–47)
HCV AB SERPL QL IA: NONREACTIVE
HGB BLD-MCNC: 16.3 G/DL (ref 11.7–15.7)
HIV 1+2 AB+HIV1 P24 AG SERPL QL IA: NONREACTIVE
MCH RBC QN AUTO: 29.2 PG (ref 26.5–33)
MCHC RBC AUTO-ENTMCNC: 31.3 G/DL (ref 31.5–36.5)
MCV RBC AUTO: 93 FL (ref 78–100)
PLATELET # BLD AUTO: 285 10E3/UL (ref 150–450)
RBC # BLD AUTO: 5.58 10E6/UL (ref 3.8–5.2)
WBC # BLD AUTO: 9.5 10E3/UL (ref 4–11)

## 2023-12-12 PROCEDURE — 80053 COMPREHEN METABOLIC PANEL: CPT | Performed by: NURSE PRACTITIONER

## 2023-12-12 PROCEDURE — 83036 HEMOGLOBIN GLYCOSYLATED A1C: CPT | Performed by: NURSE PRACTITIONER

## 2023-12-12 PROCEDURE — 86364 TISS TRNSGLTMNASE EA IG CLAS: CPT | Performed by: NURSE PRACTITIONER

## 2023-12-12 PROCEDURE — 99215 OFFICE O/P EST HI 40 MIN: CPT | Performed by: NURSE PRACTITIONER

## 2023-12-12 PROCEDURE — 87389 HIV-1 AG W/HIV-1&-2 AB AG IA: CPT | Performed by: NURSE PRACTITIONER

## 2023-12-12 PROCEDURE — 86140 C-REACTIVE PROTEIN: CPT | Performed by: NURSE PRACTITIONER

## 2023-12-12 PROCEDURE — 80061 LIPID PANEL: CPT | Performed by: NURSE PRACTITIONER

## 2023-12-12 PROCEDURE — 96127 BRIEF EMOTIONAL/BEHAV ASSMT: CPT | Performed by: NURSE PRACTITIONER

## 2023-12-12 PROCEDURE — 85027 COMPLETE CBC AUTOMATED: CPT | Performed by: NURSE PRACTITIONER

## 2023-12-12 PROCEDURE — 36415 COLL VENOUS BLD VENIPUNCTURE: CPT | Performed by: NURSE PRACTITIONER

## 2023-12-12 PROCEDURE — 84443 ASSAY THYROID STIM HORMONE: CPT | Performed by: NURSE PRACTITIONER

## 2023-12-12 PROCEDURE — 85652 RBC SED RATE AUTOMATED: CPT | Performed by: NURSE PRACTITIONER

## 2023-12-12 PROCEDURE — 86803 HEPATITIS C AB TEST: CPT | Performed by: NURSE PRACTITIONER

## 2023-12-12 RX ORDER — ALBUTEROL SULFATE 90 UG/1
2 AEROSOL, METERED RESPIRATORY (INHALATION) EVERY 4 HOURS PRN
Qty: 18 G | Refills: 1 | Status: SHIPPED | OUTPATIENT
Start: 2023-12-12

## 2023-12-12 RX ORDER — DILTIAZEM HYDROCHLORIDE 180 MG/1
180 CAPSULE, EXTENDED RELEASE ORAL DAILY
Qty: 90 CAPSULE | Refills: 3 | Status: SHIPPED | OUTPATIENT
Start: 2023-12-12

## 2023-12-12 RX ORDER — ATENOLOL AND CHLORTHALIDONE TABLET 100; 25 MG/1; MG/1
1 TABLET ORAL DAILY
Qty: 90 TABLET | Refills: 3 | Status: SHIPPED | OUTPATIENT
Start: 2023-12-12

## 2023-12-12 RX ORDER — TIRZEPATIDE 2.5 MG/.5ML
2.5 INJECTION, SOLUTION SUBCUTANEOUS
Qty: 2 ML | Refills: 0 | Status: SHIPPED | OUTPATIENT
Start: 2023-12-12 | End: 2024-01-26

## 2023-12-12 ASSESSMENT — ANXIETY QUESTIONNAIRES
5. BEING SO RESTLESS THAT IT IS HARD TO SIT STILL: NOT AT ALL
7. FEELING AFRAID AS IF SOMETHING AWFUL MIGHT HAPPEN: MORE THAN HALF THE DAYS
3. WORRYING TOO MUCH ABOUT DIFFERENT THINGS: MORE THAN HALF THE DAYS
4. TROUBLE RELAXING: SEVERAL DAYS
IF YOU CHECKED OFF ANY PROBLEMS ON THIS QUESTIONNAIRE, HOW DIFFICULT HAVE THESE PROBLEMS MADE IT FOR YOU TO DO YOUR WORK, TAKE CARE OF THINGS AT HOME, OR GET ALONG WITH OTHER PEOPLE: VERY DIFFICULT
1. FEELING NERVOUS, ANXIOUS, OR ON EDGE: SEVERAL DAYS
GAD7 TOTAL SCORE: 10
2. NOT BEING ABLE TO STOP OR CONTROL WORRYING: MORE THAN HALF THE DAYS
6. BECOMING EASILY ANNOYED OR IRRITABLE: MORE THAN HALF THE DAYS
GAD7 TOTAL SCORE: 10

## 2023-12-12 ASSESSMENT — PATIENT HEALTH QUESTIONNAIRE - PHQ9
SUM OF ALL RESPONSES TO PHQ QUESTIONS 1-9: 11
10. IF YOU CHECKED OFF ANY PROBLEMS, HOW DIFFICULT HAVE THESE PROBLEMS MADE IT FOR YOU TO DO YOUR WORK, TAKE CARE OF THINGS AT HOME, OR GET ALONG WITH OTHER PEOPLE: VERY DIFFICULT
SUM OF ALL RESPONSES TO PHQ QUESTIONS 1-9: 11

## 2023-12-12 NOTE — PROGRESS NOTES
Assessment & Plan     Jeannie was seen today for hypertension.    Diagnoses and all orders for this visit:    Dyspnea on exertion  History of tobacco dependence, long standing dyspnea on exertion.   Will plan further evaluation with PFT's and initiation of Albuterol inhaler as needed.   Continue close follow-up.    -     General PFT Lab (Please always keep checked); Future  -     albuterol (PROAIR HFA/PROVENTIL HFA/VENTOLIN HFA) 108 (90 Base) MCG/ACT inhaler; Inhale 2 puffs into the lungs every 4 hours as needed for shortness of breath, wheezing or cough    Snoring  +Daytime fatigue.   Stressed importance of further evaluation with sleep medicine.    -     Adult Sleep Eval & Management  Referral; Future    Diarrhea, unspecified type  Chronic loose stools.    Plan labs as below for further evaluation.    Recommend initiation of daily fiber supplement to bring bulk into stool.   -     CBC with platelets  -     ESR: Erythrocyte sedimentation rate  -     CRP, inflammation  -     Tissue transglutaminase jocelyne IgA and IgG  -     TSH with free T4 reflex    Lipoma of skin and subcutaneous tissue  History of lipoma removals.  Further consultation with general surgery for possible excision.    -     Adult General Surg Referral; Future    Varicose veins of bilateral lower extremities with pain  Following with vascular, encouraged follow-up.     Morbid obesity (H)  Pre-diabetes vs. New Diabetes diagnosis.   Will plan recheck of HgbA1C and glucose at follow-up visit in 4 weeks for confirmation.    Patient education completed regarding GLP-1 indications and use.    Will plan initiation and close follow-up in clinic within 4 weeks.    -     tirzepatide (MOUNJARO) 2.5 MG/0.5ML pen; Inject 2.5 mg Subcutaneous every 7 days    Benign essential hypertension  Elevated diastolic BP in clinic. Recommend patient to monitor BP at home and will see patient in clinic in 4 weeks for recheck of BP, consider antihypertensive adjustment  "at that time if continued elevation is noted.    -     atenolol-chlorthalidone (TENORETIC) 100-25 MG tablet; Take 1 tablet by mouth daily  -     diltiazem ER (DILT-XR) 180 MG 24 hr capsule; Take 1 capsule (180 mg) by mouth daily    Melanoma in situ of face (H)  Following with dermatology.     Anxiety      12/12/2023     9:47 AM   PATRICIA-7 SCORE   Total Score 10 (moderate anxiety)   Total Score 10         3/17/2022    10:43 AM 12/12/2023     9:40 AM   PHQ   PHQ-9 Total Score 17 11   Q9: Thoughts of better off dead/self-harm past 2 weeks Not at all Not at all   Restart of selective serotonin reuptake inhibitor - Sertraline 50 mg daily.   Recheck in 4 weeks.    -     sertraline (ZOLOFT) 50 MG tablet; Take 1 tablet (50 mg) by mouth daily    Screen for colon cancer  -     Colonoscopy Screening  Referral; Future    Screening for HIV (human immunodeficiency virus)  -     HIV Antigen Antibody Combo    Need for hepatitis C screening test  -     Hepatitis C Screen Reflex to HCV RNA Quant and Genotype    Screening for diabetes mellitus  Hemoglobin A1C   Date Value Ref Range Status   12/12/2023 6.5 (H) 0.0 - 5.6 % Final     Comment:     Normal <5.7%   Prediabetes 5.7-6.4%    Diabetes 6.5% or higher     Note: Adopted from ADA consensus guidelines.   Repeat fasting glucose and HgbA1C in 4 weeks for confirmation regarding new Diabetes diagnosis.    -     Comprehensive metabolic panel (BMP + Alb, Alk Phos, ALT, AST, Total. Bili, TP)  -     Hemoglobin A1c  -     Glucose, whole blood; Future    Screening for lipid disorders  -     Lipid panel reflex to direct LDL Fasting    Other orders  -     REVIEW OF HEALTH MAINTENANCE PROTOCOL ORDERS      45 minutes spent on patient counseling, chart review and charting on today's visit.         BMI:   Estimated body mass index is 50.7 kg/m  as calculated from the following:    Height as of this encounter: 1.499 m (4' 11\").    Weight as of this encounter: 113.9 kg (251 lb).     Depression " Screening Follow Up      12/12/2023     9:40 AM   PHQ   PHQ-9 Total Score 11   Q9: Thoughts of better off dead/self-harm past 2 weeks Not at all     Return in about 4 weeks (around 1/9/2024) for Preventative visit + pap smear and repeat labs .      RAFAEL Cobb CNP  [facility] JENNIFER Dennis is a 63 year old, presenting for the following health issues:  Hypertension        12/12/2023    10:03 AM   Additional Questions   Roomed by Diana ARANA     History of Present Illness         Mental Health Follow-up:  Patient presents to follow-up on Anxiety.    Patient's anxiety since last visit has been:  Medium  The patient is having other symptoms associated with anxiety.  Any significant life events: relationship concerns, grief or loss and health concerns  Patient is feeling anxious or having panic attacks.  Patient has no concerns about alcohol or drug use.    Hypertension: She presents for follow up of hypertension.  She does not check blood pressure  regularly outside of the clinic  Outside blood pressures have been over 140/90. She does not follow a low salt diet.       Vascular Disease:  She presents for follow up of vascular disease.     She never takes nitroglycerin. She is not taking daily aspirin.      Reason for visit:  Stopped taking Metformin a few months ago- made her feel sick, made her have chest pain while on it    Tobacco: 10 daily, smoked for 40 years.  Has quit here and there, used Chantix and Nicotine patches without success.    No recent URI. +Chronic cough, productive sputum.  +Shortness of breath with activity - going up stairs, walking up stairs.    Low oxygen level - similar to this summer when she was in ED.  Shortness of breath improved and then returned.      +Varicose veins - previously referred to vascular specialist - didn't have a good experience.    Has been wearing compression stockings.    Difficulty with left side symptoms.    Left ankle with  "rash, vascular symptoms.   Recommendations for radiofrequency ablation of left greater saphenous vein.    Last seen October 2022.      +Snoring, no witnessed apnea, +daytime fatigue.  Doesn't feel rested.    Takes Melatonin when she doesn't have to work - has morning fatigue.        +Weight gain of 50 lbs.   Was started on Metformin due to weight. Didn't tolerate well -   Inactive due to fatigue.      Anxiety/Depression  Has tried Sertraline in the past - didn't feel like she needed it anymore.  Stopped 1 year ago.      Gallbladder removed 25 years ago - Has had diarrhea since that time.    Has been able to control symptoms relatively well with diet.   For the past 6 months has had diarrhea every day - leaking of stool with sneezing, coughing.    No improvement with trial of BRAT diet.    Has been needing Imodium (3 tablets) for 4-5 days, then stopped a few days ago.        Works as a  and has difficulty with staying awake.        She eats 2-3 servings of fruits and vegetables daily.She consumes 0 sweetened beverage(s) daily.She exercises with enough effort to increase her heart rate 9 or less minutes per day.  She exercises with enough effort to increase her heart rate 3 or less days per week. She is missing 1 dose(s) of medications per week.  She is not taking prescribed medications regularly due to remembering to take.           3/17/2022    10:43 AM 12/12/2023     9:40 AM   PHQ   PHQ-9 Total Score 17 11   Q9: Thoughts of better off dead/self-harm past 2 weeks Not at all Not at all          12/12/2023     9:47 AM   PATRICIA-7 SCORE   Total Score 10 (moderate anxiety)   Total Score 10          Review of Systems     Constitutional, HEENT, cardiovascular, pulmonary, gi and gu systems are negative, except as otherwise noted.        Objective    BP (!) 128/92   Pulse 57   Temp 97.4  F (36.3  C)   Resp 18   Ht 1.499 m (4' 11\")   Wt 113.9 kg (251 lb)   LMP  (LMP Unknown)   SpO2 (!) 89%   BMI " 50.70 kg/m    Body mass index is 50.7 kg/m .    Physical Exam     GENERAL: healthy, alert and no distress  EYES: Eyes grossly normal to inspection  RESP: lungs clear to auscultation - no rales, rhonchi or wheezes  CV: regular rate and rhythm, normal S1 S2, no S3 or S4, no murmur, click or rub, no peripheral edema  ABDOMEN:  left mid abdomen with palpable, mobile lump, no associated erythema or ecchymosis  MS: left upper thigh and lower extremity with varicose veins   SKIN: left anterior shin/ankle with venous stasis like dermatitis skin changes with overlying scaling.   PSYCH: mentation appears normal, affect normal/bright

## 2023-12-12 NOTE — LETTER
December 22, 2023      Jeannie Barger  [address] [place] [address]        Dear ,    We are writing to inform you of your test results.    -Red blood cell (hgb) levels are slightly elevated (consistent with previous values), normal white blood cell count and normal platelet levels.     -LDL(bad) cholesterol level is elevated, HDL(good) cholesterol level is low which can increase your heart disease risk.  A diet high in fat and simple carbohydrates, genetics and being overweight can contribute to this. ADVISE: exercising 150 minutes of aerobic exercise per week (30 minutes for 5 days per week or 50 minutes for 3 days per week are options) and eating a low saturated fat/low carbohydrate diet are helpful to improve this. In 12 months, you should recheck your fasting cholesterol panel.     -Liver and gallbladder tests (ALT,AST, Alk phos,bilirubin) are normal.   -Kidney function (GFR) is normal.   -Sodium is normal.   -Potassium is normal.   -Calcium is normal.   -Glucose is slightly elevated and may be a sign of early diabetes (prediabetes). ADVISE: eating a low carbohydrate diet, exercising, trying to lose weight (if necessary) and rechecking your glucose level in 12 months.     -A1C (diabetic test) is elevated and a sign of diabetes.  ADVISE: scheduling an appointment to discuss treatments and recheck this level.  We can plan to discuss at your follow-up visit on 1/26/24.      -TSH (thyroid stimulating hormone) level is normal which indicates normal thyroid function.     -Hepatitis C antibody screen test shows no signs of a previous hepatitis C infection.     -HIV screening  test is normal.     -Celiac testing was negative.     -ESR (chronic inflammation) test was negative.     -CRP (acute inflammation) test was slightly elevated.        If you have further questions about the interpretation of your labs, labtestsonline.org is a good website to check out for further information.       Resulted Orders    HIV Antigen Antibody Combo   Result Value Ref Range    HIV Antigen Antibody Combo Nonreactive Nonreactive      Comment:      HIV-1 p24 Ag & HIV-1/HIV-2 Ab Not Detected   Hepatitis C Screen Reflex to HCV RNA Quant and Genotype   Result Value Ref Range    Hepatitis C Antibody Nonreactive Nonreactive    Narrative    Assay performance characteristics have not been established for newborns, infants, and children.   Comprehensive metabolic panel (BMP + Alb, Alk Phos, ALT, AST, Total. Bili, TP)   Result Value Ref Range    Sodium 139 135 - 145 mmol/L      Comment:      Reference intervals for this test were updated on 09/26/2023 to more accurately reflect our healthy population. There may be differences in the flagging of prior results with similar values performed with this method. Interpretation of those prior results can be made in the context of the updated reference intervals.     Potassium 4.3 3.4 - 5.3 mmol/L    Carbon Dioxide (CO2) 30 (H) 22 - 29 mmol/L    Anion Gap 10 7 - 15 mmol/L    Urea Nitrogen 10.5 8.0 - 23.0 mg/dL    Creatinine 0.59 0.51 - 0.95 mg/dL    GFR Estimate >90 >60 mL/min/1.73m2    Calcium 9.3 8.8 - 10.2 mg/dL    Chloride 99 98 - 107 mmol/L    Glucose 100 (H) 70 - 99 mg/dL    Alkaline Phosphatase 84 40 - 150 U/L      Comment:      Reference intervals for this test were updated on 11/14/2023 to more accurately reflect our healthy population. There may be differences in the flagging of prior results with similar values performed with this method. Interpretation of those prior results can be made in the context of the updated reference intervals.    AST 27 0 - 45 U/L      Comment:      Reference intervals for this test were updated on 6/12/2023 to more accurately reflect our healthy population. There may be differences in the flagging of prior results with similar values performed with this method. Interpretation of those prior results can be made in the context of the updated reference intervals.     ALT 31 0 - 50 U/L      Comment:      Reference intervals for this test were updated on 6/12/2023 to more accurately reflect our healthy population. There may be differences in the flagging of prior results with similar values performed with this method. Interpretation of those prior results can be made in the context of the updated reference intervals.      Protein Total 6.6 6.4 - 8.3 g/dL    Albumin 3.7 3.5 - 5.2 g/dL    Bilirubin Total 0.4 <=1.2 mg/dL   CBC with platelets   Result Value Ref Range    WBC Count 9.5 4.0 - 11.0 10e3/uL    RBC Count 5.58 (H) 3.80 - 5.20 10e6/uL    Hemoglobin 16.3 (H) 11.7 - 15.7 g/dL    Hematocrit 52.1 (H) 35.0 - 47.0 %    MCV 93 78 - 100 fL    MCH 29.2 26.5 - 33.0 pg    MCHC 31.3 (L) 31.5 - 36.5 g/dL    RDW 14.8 10.0 - 15.0 %    Platelet Count 285 150 - 450 10e3/uL   ESR: Erythrocyte sedimentation rate   Result Value Ref Range    Erythrocyte Sedimentation Rate 8 0 - 30 mm/hr   CRP, inflammation   Result Value Ref Range    CRP Inflammation 19.03 (H) <5.00 mg/L   Tissue transglutaminase jocelyne IgA and IgG   Result Value Ref Range    Tissue Transglutaminase Antibody IgA 0.2 <7.0 U/mL      Comment:      Negative- The tTG-IgA assay has limited utility for patients with decreased levels of IgA. Screening for celiac disease should include IgA testing to rule out selective IgA deficiency and to guide selection and interpretation of serological testing. tTG-IgG testing may be positive in celiac disease patients with IgA deficiency.    Tissue Transglutaminase Antibody IgG 1.0 <7.0 U/mL      Comment:      Negative   TSH with free T4 reflex   Result Value Ref Range    TSH 2.41 0.30 - 4.20 uIU/mL   Hemoglobin A1c   Result Value Ref Range    Hemoglobin A1C 6.5 (H) 0.0 - 5.6 %      Comment:      Normal <5.7%   Prediabetes 5.7-6.4%    Diabetes 6.5% or higher     Note: Adopted from ADA consensus guidelines.   Lipid panel reflex to direct LDL Fasting   Result Value Ref Range    Cholesterol 189 <200 mg/dL     Triglycerides 100 <150 mg/dL    Direct Measure HDL 49 (L) >=50 mg/dL    LDL Cholesterol Calculated 120 (H) <=100 mg/dL    Non HDL Cholesterol 140 (H) <130 mg/dL    Patient Fasting > 8hrs? Yes     Narrative    Cholesterol  Desirable:  <200 mg/dL    Triglycerides  Normal:  Less than 150 mg/dL  Borderline High:  150-199 mg/dL  High:  200-499 mg/dL  Very High:  Greater than or equal to 500 mg/dL    Direct Measure HDL  Female:  Greater than or equal to 50 mg/dL   Male:  Greater than or equal to 40 mg/dL    LDL Cholesterol  Desirable:  <100mg/dL  Above Desirable:  100-129 mg/dL   Borderline High:  130-159 mg/dL   High:  160-189 mg/dL   Very High:  >= 190 mg/dL    Non HDL Cholesterol  Desirable:  130 mg/dL  Above Desirable:  130-159 mg/dL  Borderline High:  160-189 mg/dL  High:  190-219 mg/dL  Very High:  Greater than or equal to 220 mg/dL       If you have any questions or concerns, please call the clinic at the number listed above.       Sincerely,        Aliyah Long, APRN, CNP

## 2023-12-13 LAB
ALBUMIN SERPL BCG-MCNC: 3.7 G/DL (ref 3.5–5.2)
ALP SERPL-CCNC: 84 U/L (ref 40–150)
ALT SERPL W P-5'-P-CCNC: 31 U/L (ref 0–50)
ANION GAP SERPL CALCULATED.3IONS-SCNC: 10 MMOL/L (ref 7–15)
AST SERPL W P-5'-P-CCNC: 27 U/L (ref 0–45)
BILIRUB SERPL-MCNC: 0.4 MG/DL
BUN SERPL-MCNC: 10.5 MG/DL (ref 8–23)
CALCIUM SERPL-MCNC: 9.3 MG/DL (ref 8.8–10.2)
CHLORIDE SERPL-SCNC: 99 MMOL/L (ref 98–107)
CHOLEST SERPL-MCNC: 189 MG/DL
CREAT SERPL-MCNC: 0.59 MG/DL (ref 0.51–0.95)
CRP SERPL-MCNC: 19.03 MG/L
DEPRECATED HCO3 PLAS-SCNC: 30 MMOL/L (ref 22–29)
EGFRCR SERPLBLD CKD-EPI 2021: >90 ML/MIN/1.73M2
FASTING STATUS PATIENT QL REPORTED: YES
GLUCOSE SERPL-MCNC: 100 MG/DL (ref 70–99)
HDLC SERPL-MCNC: 49 MG/DL
LDLC SERPL CALC-MCNC: 120 MG/DL
NONHDLC SERPL-MCNC: 140 MG/DL
POTASSIUM SERPL-SCNC: 4.3 MMOL/L (ref 3.4–5.3)
PROT SERPL-MCNC: 6.6 G/DL (ref 6.4–8.3)
SODIUM SERPL-SCNC: 139 MMOL/L (ref 135–145)
TRIGL SERPL-MCNC: 100 MG/DL
TSH SERPL DL<=0.005 MIU/L-ACNC: 2.41 UIU/ML (ref 0.3–4.2)
TTG IGA SER-ACNC: 0.2 U/ML
TTG IGG SER-ACNC: 1 U/ML

## 2023-12-22 ENCOUNTER — TELEPHONE (OUTPATIENT)
Dept: FAMILY MEDICINE | Facility: CLINIC | Age: 63
End: 2023-12-22
Payer: COMMERCIAL

## 2023-12-22 NOTE — TELEPHONE ENCOUNTER
Prior Authorization Retail Medication Request    Medication/Dose: Mounjaro 2.5MG/0.5ML Pen-injectors   Diagnosis and ICD code (if different than what is on RX):    New/renewal/insurance change PA/secondary ins. PA:  Previously Tried and Failed:    Rationale:      Insurance   Primary: [employer] [place]  Insurance ID:  [id number]     Secondary (if applicable):   Insurance ID:      Pharmacy Information (if different than what is on RX)  [place]  Phone:  [phone]  Fax:  [phone]

## 2023-12-28 NOTE — TELEPHONE ENCOUNTER
Central Prior Authorization Team   Phone: [phone]    PA Initiation    Medication: Mounjaro 2.5MG/0.5ML Pen-injectors   Insurance Company: Comment:  [employer]  Pharmacy Filling the Rx: [id number] - [place], [place] - [address]  Filling Pharmacy Phone: [phone]  Filling Pharmacy Fax:    Start Date: 12/28/2023

## 2023-12-28 NOTE — TELEPHONE ENCOUNTER
Prior Authorization Approval    Authorization Effective Date: 12/28/2023  Authorization Expiration Date: 12/27/2024  Medication: Mounjaro 2.5MG/0.5ML Pen-injectors   Reference #:     Insurance Company: Comment:  [employer]  Which Pharmacy is filling the prescription (Not needed for infusion/clinic administered): [id number] - [place], [place] - [address]  Pharmacy Notified: Yes  Patient Notified: Instructed pharmacy to notify patient when script is ready to /ship.

## 2024-01-26 ENCOUNTER — OFFICE VISIT (OUTPATIENT)
Dept: FAMILY MEDICINE | Facility: CLINIC | Age: 64
End: 2024-01-26
Payer: COMMERCIAL

## 2024-01-26 VITALS
HEIGHT: 55 IN | BODY MASS INDEX: 54.62 KG/M2 | WEIGHT: 236 LBS | OXYGEN SATURATION: 91 % | SYSTOLIC BLOOD PRESSURE: 132 MMHG | DIASTOLIC BLOOD PRESSURE: 88 MMHG | HEART RATE: 67 BPM | TEMPERATURE: 97.8 F

## 2024-01-26 DIAGNOSIS — Z12.4 CERVICAL CANCER SCREENING: ICD-10-CM

## 2024-01-26 DIAGNOSIS — Z23 NEED FOR COVID-19 VACCINE: ICD-10-CM

## 2024-01-26 DIAGNOSIS — R73.03 PREDIABETES: ICD-10-CM

## 2024-01-26 DIAGNOSIS — Z00.00 ROUTINE GENERAL MEDICAL EXAMINATION AT A HEALTH CARE FACILITY: Primary | ICD-10-CM

## 2024-01-26 DIAGNOSIS — Z13.1 SCREENING FOR DIABETES MELLITUS: ICD-10-CM

## 2024-01-26 DIAGNOSIS — Z23 NEED FOR TETANUS BOOSTER: ICD-10-CM

## 2024-01-26 DIAGNOSIS — Z23 NEED FOR PROPHYLACTIC VACCINATION AND INOCULATION AGAINST INFLUENZA: ICD-10-CM

## 2024-01-26 DIAGNOSIS — J06.9 VIRAL URI WITH COUGH: ICD-10-CM

## 2024-01-26 DIAGNOSIS — E66.01 MORBID OBESITY (H): ICD-10-CM

## 2024-01-26 DIAGNOSIS — D03.30 MELANOMA IN SITU OF FACE (H): ICD-10-CM

## 2024-01-26 LAB
GLUCOSE BLD-MCNC: 113 MG/DL (ref 60–99)
HBA1C MFR BLD: 6.7 % (ref 0–5.6)

## 2024-01-26 PROCEDURE — 82947 ASSAY GLUCOSE BLOOD QUANT: CPT | Performed by: NURSE PRACTITIONER

## 2024-01-26 PROCEDURE — 36415 COLL VENOUS BLD VENIPUNCTURE: CPT | Performed by: NURSE PRACTITIONER

## 2024-01-26 PROCEDURE — 90686 IIV4 VACC NO PRSV 0.5 ML IM: CPT | Performed by: NURSE PRACTITIONER

## 2024-01-26 PROCEDURE — G0124 SCREEN C/V THIN LAYER BY MD: HCPCS | Performed by: PATHOLOGY

## 2024-01-26 PROCEDURE — 83036 HEMOGLOBIN GLYCOSYLATED A1C: CPT | Performed by: NURSE PRACTITIONER

## 2024-01-26 PROCEDURE — 90715 TDAP VACCINE 7 YRS/> IM: CPT | Performed by: NURSE PRACTITIONER

## 2024-01-26 PROCEDURE — 90472 IMMUNIZATION ADMIN EACH ADD: CPT | Performed by: NURSE PRACTITIONER

## 2024-01-26 PROCEDURE — G0145 SCR C/V CYTO,THINLAYER,RESCR: HCPCS | Performed by: NURSE PRACTITIONER

## 2024-01-26 PROCEDURE — 99213 OFFICE O/P EST LOW 20 MIN: CPT | Mod: 25 | Performed by: NURSE PRACTITIONER

## 2024-01-26 PROCEDURE — 91320 SARSCV2 VAC 30MCG TRS-SUC IM: CPT | Performed by: NURSE PRACTITIONER

## 2024-01-26 PROCEDURE — 90480 ADMN SARSCOV2 VAC 1/ONLY CMP: CPT | Performed by: NURSE PRACTITIONER

## 2024-01-26 PROCEDURE — 99396 PREV VISIT EST AGE 40-64: CPT | Mod: 25 | Performed by: NURSE PRACTITIONER

## 2024-01-26 PROCEDURE — 87624 HPV HI-RISK TYP POOLED RSLT: CPT | Performed by: NURSE PRACTITIONER

## 2024-01-26 PROCEDURE — 90471 IMMUNIZATION ADMIN: CPT | Performed by: NURSE PRACTITIONER

## 2024-01-26 RX ORDER — TIRZEPATIDE 5 MG/.5ML
5 INJECTION, SOLUTION SUBCUTANEOUS
Qty: 2 ML | Refills: 1 | Status: SHIPPED | OUTPATIENT
Start: 2024-01-26 | End: 2024-02-26

## 2024-01-26 ASSESSMENT — ANXIETY QUESTIONNAIRES
3. WORRYING TOO MUCH ABOUT DIFFERENT THINGS: SEVERAL DAYS
4. TROUBLE RELAXING: NOT AT ALL
7. FEELING AFRAID AS IF SOMETHING AWFUL MIGHT HAPPEN: NOT AT ALL
7. FEELING AFRAID AS IF SOMETHING AWFUL MIGHT HAPPEN: NOT AT ALL
IF YOU CHECKED OFF ANY PROBLEMS ON THIS QUESTIONNAIRE, HOW DIFFICULT HAVE THESE PROBLEMS MADE IT FOR YOU TO DO YOUR WORK, TAKE CARE OF THINGS AT HOME, OR GET ALONG WITH OTHER PEOPLE: SOMEWHAT DIFFICULT
2. NOT BEING ABLE TO STOP OR CONTROL WORRYING: SEVERAL DAYS
GAD7 TOTAL SCORE: 2
GAD7 TOTAL SCORE: 2
1. FEELING NERVOUS, ANXIOUS, OR ON EDGE: NOT AT ALL
5. BEING SO RESTLESS THAT IT IS HARD TO SIT STILL: NOT AT ALL
6. BECOMING EASILY ANNOYED OR IRRITABLE: NOT AT ALL
8. IF YOU CHECKED OFF ANY PROBLEMS, HOW DIFFICULT HAVE THESE MADE IT FOR YOU TO DO YOUR WORK, TAKE CARE OF THINGS AT HOME, OR GET ALONG WITH OTHER PEOPLE?: SOMEWHAT DIFFICULT
GAD7 TOTAL SCORE: 2

## 2024-01-26 ASSESSMENT — ENCOUNTER SYMPTOMS
CONSTIPATION: 0
HEMATOCHEZIA: 0
ARTHRALGIAS: 0
HEARTBURN: 0
DIZZINESS: 0
NAUSEA: 0
SHORTNESS OF BREATH: 0
COUGH: 0
FEVER: 0
HEADACHES: 0
NERVOUS/ANXIOUS: 0
FREQUENCY: 0
DYSURIA: 0
SORE THROAT: 0
CHILLS: 0
BREAST MASS: 0
PALPITATIONS: 0
PARESTHESIAS: 0
EYE PAIN: 0
ABDOMINAL PAIN: 0
JOINT SWELLING: 0
WEAKNESS: 0
MYALGIAS: 0
DIARRHEA: 0
HEMATURIA: 0

## 2024-01-26 ASSESSMENT — PATIENT HEALTH QUESTIONNAIRE - PHQ9
SUM OF ALL RESPONSES TO PHQ QUESTIONS 1-9: 5
SUM OF ALL RESPONSES TO PHQ QUESTIONS 1-9: 5
10. IF YOU CHECKED OFF ANY PROBLEMS, HOW DIFFICULT HAVE THESE PROBLEMS MADE IT FOR YOU TO DO YOUR WORK, TAKE CARE OF THINGS AT HOME, OR GET ALONG WITH OTHER PEOPLE: SOMEWHAT DIFFICULT

## 2024-01-26 NOTE — PROGRESS NOTES
Preventive Care Visit  [facility]  RAFAEL Cobb CNP, Family Medicine  Jan 26, 2024       SUBJECTIVE:   Jeannie is a 63 year old, presenting for the following:  Physical and Imm/Inj (Flu Shot)        1/26/2024     8:53 AM   Additional Questions   Roomed by ranjit cochran     Healthy Habits:     Getting at least 3 servings of Calcium per day:  Yes    Bi-annual eye exam:  NO    Dental care twice a year:  NO    Sleep apnea or symptoms of sleep apnea:  Daytime drowsiness, Excessive snoring and Sleep apnea    Diet:  Regular (no restrictions)    Frequency of exercise:  None    Taking medications regularly:  Yes    Medication side effects:  None    Additional concerns today:  No    Today's PHQ-9 Score:       1/26/2024     8:46 AM   PHQ-9 SCORE   PHQ-9 Total Score MyChart 5 (Mild depression)   PHQ-9 Total Score 5       Things are going well with Mounjaro.   Starting to feel more energetic and is ready for a dose adjustment.          12/12/2023     9:47 AM 1/26/2024     8:47 AM   PATRICIA-7 SCORE   Total Score 10 (moderate anxiety) 2 (minimal anxiety)   Total Score 10 2         3/17/2022    10:43 AM 12/12/2023     9:40 AM 1/26/2024     8:46 AM   PHQ   PHQ-9 Total Score 17 11 5   Q9: Thoughts of better off dead/self-harm past 2 weeks Not at all Not at all Not at all       Recent Labs   Lab Test 12/12/23  1049 03/17/22  1043   CHOL 189 210*   HDL 49* 41*   * 141*   TRIG 100 141     Hemoglobin A1C   Date Value Ref Range Status   01/26/2024 6.7 (H) 0.0 - 5.6 % Final     Comment:     Normal <5.7%   Prediabetes 5.7-6.4%    Diabetes 6.5% or higher     Note: Adopted from ADA consensus guidelines.   12/12/2023 6.5 (H) 0.0 - 5.6 % Final     Comment:     Normal <5.7%   Prediabetes 5.7-6.4%    Diabetes 6.5% or higher     Note: Adopted from ADA consensus guidelines.       Hypertension Follow-up    Do you check your blood pressure regularly outside of the clinic? Yes   Are you following a low salt diet? No  Are  your blood pressures ever more than 140 on the top number (systolic) OR more   than 90 on the bottom number (diastolic), for example 140/90? Yes    Depression and Anxiety Follow-Up  How are you doing with your depression since your last visit? Improved   How are you doing with your anxiety since your last visit?  No change  Are you having other symptoms that might be associated with depression or anxiety? Yes:  lack of interest  Have you had a significant life event? No   Do you have any concerns with your use of alcohol or other drugs? No    Social History     Tobacco Use    Smoking status: Former     Packs/day: 0.75     Years: 20.00     Additional pack years: 0.00     Total pack years: 15.00     Types: Cigarettes    Smokeless tobacco: Never   Vaping Use    Vaping Use: Never used   Substance Use Topics    Alcohol use: No    Drug use: No         3/17/2022    10:43 AM 12/12/2023     9:40 AM 1/26/2024     8:46 AM   PHQ   PHQ-9 Total Score 17 11 5   Q9: Thoughts of better off dead/self-harm past 2 weeks Not at all Not at all Not at all         12/12/2023     9:47 AM 1/26/2024     8:47 AM   PATRICIA-7 SCORE   Total Score 10 (moderate anxiety) 2 (minimal anxiety)   Total Score 10 2         1/26/2024     8:46 AM   Last PHQ-9   1.  Little interest or pleasure in doing things 1   2.  Feeling down, depressed, or hopeless 1   3.  Trouble falling or staying asleep, or sleeping too much 1   4.  Feeling tired or having little energy 1   5.  Poor appetite or overeating 1   6.  Feeling bad about yourself 0   7.  Trouble concentrating 0   8.  Moving slowly or restless 0   Q9: Thoughts of better off dead/self-harm past 2 weeks 0   PHQ-9 Total Score 5         1/26/2024     8:47 AM   PATRICIA-7    1. Feeling nervous, anxious, or on edge 0   2. Not being able to stop or control worrying 1   3. Worrying too much about different things 1   4. Trouble relaxing 0   5. Being so restless that it is hard to sit still 0   6. Becoming easily annoyed or  irritable 0   7. Feeling afraid, as if something awful might happen 0   PATRICIA-7 Total Score 2   If you checked any problems, how difficult have they made it for you to do your work, take care of things at home, or get along with other people? Somewhat difficult         Social History     Tobacco Use    Smoking status: Former     Packs/day: 0.75     Years: 20.00     Additional pack years: 0.00     Total pack years: 15.00     Types: Cigarettes    Smokeless tobacco: Never   Substance Use Topics    Alcohol use: No           2024     8:50 AM   Alcohol Use   Prescreen: >3 drinks/day or >7 drinks/week? Yes     Reviewed orders with patient.  Reviewed health maintenance and updated orders accordingly - Yes  BP Readings from Last 3 Encounters:   24 132/88   23 (!) 128/92   23 (!) 193/76    Wt Readings from Last 3 Encounters:   24 107 kg (236 lb)   23 113.9 kg (251 lb)   22 110.7 kg (244 lb)                  Patient Active Problem List   Diagnosis    Asymptomatic varicose veins    Other kyphoscoliosis and scoliosis    Obesity    Personal history of tobacco use, presenting hazards to health    Numbness in toe    Morbid obesity (H)    Benign essential hypertension    Melanoma in situ of face (H)    Venous stasis dermatitis of left lower extremity     Past Surgical History:   Procedure Laterality Date    [facility] LAPAROSCOPY, SURGICAL; CHOLECYSTECTOMY      Cholecystectomy, Laparoscopic    [facility] REVISE MEDIAN N/CARPAL TUNNEL SURG      ZZC  DELIVERY ONLY      , Low Cervical x 2    ZZC LIGATE FALLOPIAN TUBE,POSTPARTUM      Tubal Ligation    Z NONSPECIFIC PROCEDURE      Removal of cart. in l knee    ZZ TREAT ECTOPIC PREG,NON REMVAL      Tube & Ectopic Preg., Removal       Social History     Tobacco Use    Smoking status: Former     Packs/day: 0.75     Years: 20.00     Additional pack years: 0.00     Total pack years: 15.00     Types: Cigarettes    Smokeless tobacco:  Never   Substance Use Topics    Alcohol use: No     Family History   Problem Relation Age of Onset    Diabetes Mother     Hypertension Mother     Aneurysm Mother         brain    Hypertension Father     Diabetes Type 2  Father     Peripheral Vascular Disease Father         fem pop bypass    Hypertension Sister     Multiple Sclerosis Brother     Heart Disease Paternal Grandmother         mid 60 --MI x 6    Cancer No family hx of     Alzheimer Disease No family hx of          Current Outpatient Medications   Medication Sig Dispense Refill    albuterol (PROAIR HFA/PROVENTIL HFA/VENTOLIN HFA) 108 (90 Base) MCG/ACT inhaler Inhale 2 puffs into the lungs every 4 hours as needed for shortness of breath, wheezing or cough 18 g 1    atenolol-chlorthalidone (TENORETIC) 100-25 MG tablet Take 1 tablet by mouth daily 90 tablet 3    diltiazem ER (DILT-XR) 180 MG 24 hr capsule Take 1 capsule (180 mg) by mouth daily 90 capsule 3    sertraline (ZOLOFT) 50 MG tablet Take 1 tablet (50 mg) by mouth daily 30 tablet 2    tirzepatide (MOUNJARO) 5 MG/0.5ML pen Inject 5 mg Subcutaneous every 7 days 2 mL 1    sertraline (ZOLOFT) 100 MG tablet TAKE 1 TABLET BY MOUTH EVERY DAY (Patient not taking: Reported on 1/26/2024) 90 tablet 1       Breast Cancer Screening:    FHS-7:       2/1/2022     9:37 AM 1/6/2023    10:27 AM   Breast CA Risk Assessment (FHS-7)   Did any of your first-degree relatives have breast or ovarian cancer? No No   Did any of your relatives have bilateral breast cancer? No No   Did any man in your family have breast cancer? No No   Did any woman in your family have breast and ovarian cancer? No No   Did any woman in your family have breast cancer before age 50 y? No No   Do you have 2 or more relatives with breast and/or ovarian cancer? No No   Do you have 2 or more relatives with breast and/or bowel cancer? No No       Mammogram Screening: Recommended mammography every 1-2 years with patient discussion and risk factor  "consideration    Pertinent mammograms are reviewed under the imaging tab.    History of abnormal Pap smear: NO - age 30-65 PAP every 5 years with negative HPV co-testing recommended     Reviewed and updated as needed this visit by clinical staff     Meds              Reviewed and updated as needed this visit by Provider                  No past medical history on file.   Past Surgical History:   Procedure Laterality Date    HC LAPAROSCOPY, SURGICAL; CHOLECYSTECTOMY      Cholecystectomy, Laparoscopic    HC REVISE MEDIAN N/CARPAL TUNNEL SURG      ZZC  DELIVERY ONLY      , Low Cervical x 2    [facility] LIGATE FALLOPIAN TUBE,POSTPARTUM      Tubal Ligation    [facility] NONSPECIFIC PROCEDURE      Removal of cart. in l knee    [facility] TREAT ECTOPIC PREG,NON REMVAL      Tube & Ectopic Preg., Removal     Review of Systems   Constitutional:  Negative for chills and fever.   HENT:  Negative for congestion, ear pain, hearing loss and sore throat.    Eyes:  Negative for pain and visual disturbance.   Respiratory:  Negative for cough and shortness of breath.    Cardiovascular:  Negative for chest pain and palpitations.   Gastrointestinal:  Negative for abdominal pain, constipation, diarrhea and nausea.   Genitourinary:  Negative for dysuria, frequency, genital sores, hematuria, pelvic pain, urgency, vaginal bleeding and vaginal discharge.   Musculoskeletal:  Negative for arthralgias, joint swelling and myalgias.   Skin:  Negative for rash.   Neurological:  Negative for dizziness, weakness and headaches.   Psychiatric/Behavioral:  The patient is not nervous/anxious.        OBJECTIVE:   /88   Pulse 67   Temp 97.8  F (36.6  C)   Ht 1.245 m (4' 1\")   Wt 107 kg (236 lb)   LMP  (LMP Unknown)   SpO2 91%   BMI 69.11 kg/m     Estimated body mass index is 69.11 kg/m  as calculated from the following:    Height as of this encounter: 1.245 m (4' 1\").    Weight as of this encounter: 107 kg (236 lb).    Physical " Exam    GENERAL: alert and no distress  EYES: Eyes grossly normal to inspection  HENT: ear canals and TM's normal, nose and mouth without ulcers or lesions  RESP: posterior lungs sounds with good airflow, wheezing heard on exhalation   CV: regular rate and rhythm, normal S1 S2, no S3 or S4, no murmur, click or rub, no peripheral edema   (female): normal female external genitalia, normal urethral meatus, vaginal mucosa pink, moist, well rugated, and normal cervix/adnexa/uterus without masses or discharge  MS: no gross musculoskeletal defects noted, no edema  SKIN: no suspicious lesions or rashes  PSYCH: mentation appears normal, affect normal/bright      ASSESSMENT/PLAN:     Jeannie was seen today for physical and imm/inj.    Diagnoses and all orders for this visit:    Routine general medical examination at a health care facility  -     PRIMARY CARE FOLLOW-UP SCHEDULING; Future    Need for COVID-19 vaccine  -     COVID-19 12+ (2023-24) (PFIZER)    Cervical cancer screening  -     Pap Screen with HPV - recommended age 30 - 65 years    Screening for diabetes mellitus  -     Hemoglobin A1c  -     Glucose, whole blood    Need for tetanus booster  -     TDAP 10-64Y (ADACEL,BOOSTRIX)    Need for prophylactic vaccination and inoculation against influenza  -     INFLUENZA VACCINE >6 MONTHS (AFLURIA/FLUZONE)    Morbid obesity (H)  Prediabetes  Patient initiated Mounjaro 2.5 mg one month ago, tolerating well.  Plan dose increase to 5 mg once weekly and will plan follow-up in 2 months for recheck.    -     tirzepatide (MOUNJARO) 5 MG/0.5ML pen; Inject 5 mg Subcutaneous every 7 days      Viral URI with cough  Patient education completed regarding viral cause, typical course, symptomatic treatment and when to follow-up.   Recommend use of Albuterol inhaler for cough/wheezing.     Melanoma in situ of face (H)  Following with dermatology.        Counseling  Reviewed preventive health counseling, as reflected in patient  "instructions      BMI  Estimated body mass index is 69.11 kg/m  as calculated from the following:    Height as of this encounter: 1.245 m (4' 1\").    Weight as of this encounter: 107 kg (236 lb).         She reports that she has quit smoking. Her smoking use included cigarettes. She has a 15 pack-year smoking history. She has never used smokeless tobacco.      Signed Electronically by: RAFAEL Cobb CNP        "

## 2024-01-30 ENCOUNTER — HOSPITAL ENCOUNTER (OUTPATIENT)
Dept: MAMMOGRAPHY | Facility: CLINIC | Age: 64
Discharge: HOME | End: 2024-01-30
Attending: NURSE PRACTITIONER | Admitting: NURSE PRACTITIONER
Payer: COMMERCIAL

## 2024-01-30 DIAGNOSIS — Z12.31 VISIT FOR SCREENING MAMMOGRAM: ICD-10-CM

## 2024-01-30 PROCEDURE — 77063 BREAST TOMOSYNTHESIS BI: CPT

## 2024-01-31 LAB
BKR LAB AP GYN ADEQUACY: ABNORMAL
BKR LAB AP GYN INTERPRETATION: ABNORMAL
BKR LAB AP HPV REFLEX: ABNORMAL
BKR LAB AP PREVIOUS ABNORMAL: ABNORMAL
PATH REPORT.COMMENTS IMP SPEC: ABNORMAL
PATH REPORT.COMMENTS IMP SPEC: ABNORMAL
PATH REPORT.RELEVANT HX SPEC: ABNORMAL

## 2024-02-02 ENCOUNTER — TELEPHONE (OUTPATIENT)
Dept: FAMILY MEDICINE | Facility: CLINIC | Age: 64
End: 2024-02-02

## 2024-02-02 ENCOUNTER — PATIENT OUTREACH (OUTPATIENT)
Dept: FAMILY MEDICINE | Facility: CLINIC | Age: 64
End: 2024-02-02
Payer: COMMERCIAL

## 2024-02-02 PROBLEM — R87.610 ASCUS WITH POSITIVE HIGH RISK HPV CERVICAL: Status: ACTIVE | Noted: 2024-01-26

## 2024-02-02 PROBLEM — R87.810 ASCUS WITH POSITIVE HIGH RISK HPV CERVICAL: Status: ACTIVE | Noted: 2024-01-26

## 2024-02-02 LAB
HUMAN PAPILLOMA VIRUS 16 DNA: NEGATIVE
HUMAN PAPILLOMA VIRUS 18 DNA: NEGATIVE
HUMAN PAPILLOMA VIRUS FINAL DIAGNOSIS: ABNORMAL
HUMAN PAPILLOMA VIRUS OTHER HR: POSITIVE

## 2024-02-02 NOTE — TELEPHONE ENCOUNTER
Left message to call back. When patient calls back please help patient schedule a colposcopy with Dr. Cox, needs procedure room.

## 2024-02-05 NOTE — TELEPHONE ENCOUNTER
Attempt #2    LM w/patient to call to schedule appt with provider for Coposcopy.      Veronika MADISON

## 2024-02-14 ENCOUNTER — OFFICE VISIT (OUTPATIENT)
Dept: OPTOMETRY | Facility: CLINIC | Age: 64
End: 2024-02-14
Payer: COMMERCIAL

## 2024-02-14 DIAGNOSIS — H52.4 PRESBYOPIA: ICD-10-CM

## 2024-02-14 DIAGNOSIS — H04.129 DRY EYE: ICD-10-CM

## 2024-02-14 DIAGNOSIS — H26.9 BILATERAL INCIPIENT CATARACTS: ICD-10-CM

## 2024-02-14 DIAGNOSIS — H18.523 CORNEAL EPITHELIAL BASEMENT MEMBRANE DYSTROPHY OF BOTH EYES: ICD-10-CM

## 2024-02-14 DIAGNOSIS — H52.203 HYPEROPIA OF BOTH EYES WITH ASTIGMATISM: Primary | ICD-10-CM

## 2024-02-14 DIAGNOSIS — H52.03 HYPEROPIA OF BOTH EYES WITH ASTIGMATISM: Primary | ICD-10-CM

## 2024-02-14 PROCEDURE — 92004 COMPRE OPH EXAM NEW PT 1/>: CPT | Performed by: OPTOMETRIST

## 2024-02-14 PROCEDURE — 92015 DETERMINE REFRACTIVE STATE: CPT | Performed by: OPTOMETRIST

## 2024-02-14 ASSESSMENT — REFRACTION_MANIFEST
OS_ADD: +2.25
OS_SPHERE: -1.75
OD_CYLINDER: +1.75
OD_AXIS: 040
OS_AXIS: 140
OS_CYLINDER: +1.25
OD_ADD: +2.25
OS_SPHERE: -2.50
OS_CYLINDER: +1.75
OD_SPHERE: -0.25
METHOD_AUTOREFRACTION: 1
OD_CYLINDER: +1.00
OD_SPHERE: -2.00
OS_AXIS: 098
OD_AXIS: 047

## 2024-02-14 ASSESSMENT — CUP TO DISC RATIO
OD_RATIO: 0.25
OS_RATIO: 0.2

## 2024-02-14 ASSESSMENT — KERATOMETRY
OD_AXISANGLE2_DEGREES: 151
OD_K1POWER_DIOPTERS: 40.75
OS_K2POWER_DIOPTERS: 44.25
OS_AXISANGLE2_DEGREES: 5
OD_K2POWER_DIOPTERS: 43
OS_AXISANGLE_DEGREES: 95
OD_AXISANGLE_DEGREES: 61
OS_K1POWER_DIOPTERS: 41.25

## 2024-02-14 ASSESSMENT — CONF VISUAL FIELD
OS_NORMAL: 1
OD_SUPERIOR_TEMPORAL_RESTRICTION: 0
OD_NORMAL: 1
OD_SUPERIOR_NASAL_RESTRICTION: 0
OS_SUPERIOR_TEMPORAL_RESTRICTION: 0
METHOD: COUNTING FINGERS
OD_INFERIOR_TEMPORAL_RESTRICTION: 0
OS_INFERIOR_TEMPORAL_RESTRICTION: 0
OS_INFERIOR_NASAL_RESTRICTION: 0
OD_INFERIOR_NASAL_RESTRICTION: 0
OS_SUPERIOR_NASAL_RESTRICTION: 0

## 2024-02-14 ASSESSMENT — VISUAL ACUITY
OS_SC: 20/50
OD_SC: 20/30
OS_SC: 20/60
OS_PH_SC: 20/25
OD_SC: 20/80
METHOD: SNELLEN - LINEAR

## 2024-02-14 ASSESSMENT — EXTERNAL EXAM - LEFT EYE: OS_EXAM: NORMAL

## 2024-02-14 ASSESSMENT — EXTERNAL EXAM - RIGHT EYE: OD_EXAM: NORMAL

## 2024-02-14 ASSESSMENT — SLIT LAMP EXAM - LIDS
COMMENTS: NORMAL
COMMENTS: NORMAL

## 2024-02-14 ASSESSMENT — TONOMETRY
IOP_METHOD: APPLANATION
OS_IOP_MMHG: 20
OD_IOP_MMHG: 20

## 2024-02-14 NOTE — LETTER
2/14/2024         RE: Jeannie Barger  [address] [place] [address]        Dear Colleague,    Thank you for referring your patient, Jeannie Barger, to the [facility]. Please see a copy of my visit note below.    Chief Complaint   Patient presents with     Annual Eye Exam        Last Eye Exam: 10 years ago not since lasik  Dilated Previously: Yes, side effects of dilation explained today    What are you currently using to see?  readers       Distance Vision Acuity: Noticed gradual change in left eye    Near Vision Acuity: Satisfied with vision while reading and using computer with readers    Eye Comfort: dry and mattery - feels like something is stuck in left eye   Do you use eye drops? : No      Mere Chairez - Optometric Assistant           Medical, surgical and family histories reviewed and updated 2/14/2024.     Mother has ebmd     OBJECTIVE: See Ophthalmology exam    ASSESSMENT:    ICD-10-CM    1. Hyperopia of both eyes with astigmatism  H52.03 EYE EXAM (SIMPLE-NONBILLABLE)    H52.203 REFRACTION      2. Presbyopia  H52.4 EYE EXAM (SIMPLE-NONBILLABLE)     REFRACTION      3. Dry eye  H04.129       4. 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 Bilateral incipient cataracts  H26.9         S/p lasik    PLAN:   Distance prescription or progressive addition lens   Artificial tears  pf four times daily for several days then as needed   Discussed erosions     Simona Ricardo OD     Again, thank you for allowing me to participate in the care of your patient.        Sincerely,        Simona Ricardo, OD

## 2024-02-14 NOTE — PROGRESS NOTES
Chief Complaint   Patient presents with    Annual Eye Exam        Last Eye Exam: 10 years ago not since lasik  Dilated Previously: Yes, side effects of dilation explained today    What are you currently using to see?  readers       Distance Vision Acuity: Noticed gradual change in left eye    Near Vision Acuity: Satisfied with vision while reading and using computer with readers    Eye Comfort: dry and mattery - feels like something is stuck in left eye   Do you use eye drops? : No      Mere Chairez - Optometric Assistant           Medical, surgical and family histories reviewed and updated 2/14/2024.     Mother has ebmd     OBJECTIVE: See Ophthalmology exam    ASSESSMENT:    ICD-10-CM    1. Hyperopia of both eyes with astigmatism  H52.03 EYE EXAM (SIMPLE-NONBILLABLE)    H52.203 REFRACTION      2. Presbyopia  H52.4 EYE EXAM (SIMPLE-NONBILLABLE)     REFRACTION      3. Dry eye  H04.129       4. 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 Bilateral incipient cataracts  H26.9         S/p lasik    PLAN:   Distance prescription or progressive addition lens   Artificial tears  pf four times daily for several days then as needed   Discussed erosions     Simona Ricardo OD

## 2024-02-14 NOTE — PATIENT INSTRUCTIONS
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 This is one of the most common types of corneal dystrophy and is usually asymptomatic.   In Anterior Basement Membrane Dystrophy (ABMD) or Map Dot Dystrophy, the corneal epithelium does not function properly. The epithelium is a thin layer of surface skin covering the cornea.  The next layer is the basement membrane which functions as a sticky anchor over which the epithelium grows. The basement membrane stabilizes the epithelium.   There are generally two conditions which cause basement membrane dysfunction - one inherited, and one acquired by a deep corneal abrasion (scratch) which damages the basement membrane. This condition is common, treatable, and rarely leads to significant vision loss.      DRY EYE TREATMENT    I recommend using artificial tears for your dry eye. There are over the counter drops that work well and may be used up to 4x daily. ( systane balance, complete or ultra, blink, refresh optive, soothe xp, theratears) stay away from any red eye relief products such as visine and clear eyes. Lumify can help with redness used once daily.     If you need more than 4 drops daily, use a preservative free product which come in individual vials and may be used for up to 24 hours and then discarded.   The more severe the dry eye, the more frequent instillation of artificial tears that are needed to reduce irritation/ inflammation. (Sometimes every 1-2 hours for a couple of days).  You can also add a lubricating ointment in the lower lid at bedtime. ( over the counter refresh pm, genteal gel, lacrilube etc.)    Artificial tears work best as a preventative and not as well after your eyes are starting to bother you and/ or are red.  It may take 4- 6 weeks of using the drops before you notice improvement.  If after that time you are still having problems schedule an appointment for an evaluation to discuss different treatments.    Dry eyes are a chronic condition  and you may have more symptoms at certain times of the year.      Additional recommended treatment:  Warm compresses once to twice daily for 5-10 minutes  Directions for warm soaks  There are few methods for hot compresses. Moisten a washcloth with hot water, or microwave for 10 seconds, being careful to not get the cloth too hot.   Then put the washcloth onto your eyelids for 5 minutes. It will cool quickly so a rice pack or eyemask that can be heated and laid on top of the washcloth will help retain the heat.   Lid cleansing    Overabundance of bacterial microorganisms along the eyelashes and lid margins induce stress on the tear film and promote inflammation.  Regular lid hygiene helps diminish the bacterial population to prevent inflammation and infection.  Use a warm compress to loosen any crusts   Cleanse lids once daily with a lid cleansing product as directed such as Ocusoft or Sterilid which can be purchased at most pharmacies. Diluted baby shampoo will also work, but not as well as it dries the skin and can irritate eyelids.  Hypo chlor spray may also be used on closed lids.    Blink regularly  Stay hydrated  Increase humidity  Wear sunglasses   No smoking/ vaping   Replace cosmetics every few months and remove daily  Avoid direct exposure to forced air--turn air vents in the car away and keep fans from blowing directly on your face

## 2024-02-20 NOTE — PROGRESS NOTES
otherSUBJECTIVE:  Jeannie Barger is a 64 year old female  patient in today for  colposcopy, referred by RAFAEL Moore, CNP  . Pap smear 1 months ago showed: ASCUS with other high risk HPV positive . The prior pap showed Normal.   No LMP recorded (lmp unknown). Patient is postmenopausal..  urine pregnancy test results today: negative.   Allergies:  No Known Allergies    Prior cervical/vaginal disease: HPV related changes.distant history of abnormal pap but no treatment or LEEP needed.  Pt is a  smoker- quit for several months, but started again recently.  Discussed increased risk for precancerous and cancerous changes of the cervix in women who smoke.  Strongly encouraged cessation.   Prior cervical treatment: no treatment.    Procedure for colposcopy and biopsy has been explained to the   Patient in detail. Before the procedure, it was ensured that the patient was educated regarding the nature of her findings to date, the implications of them, and what was to be done. She has been made aware of the role of HPV, the natural history of infection, ways to minimize her future risk, the effect of HPV on the cervix, and treatment options available should they be indicated.     The details of the colposcopic procedure were reviewed, as well as the risks of missed diagnoses, pain, infection and bleeding. All questions were answered before proceeding, and informed consent was therefore obtained.  Risks, benefits and alternative explained. All pt's questions asked and answered.  Full  written informed consent, including risks of bleeding, infection, and permanent scarring, was obtained and sent to scan into Epic.      Pause for the cause with identification of the patient's full name, birthdate, and reason and location of the procedure was done and confirmed today with me, Harmony Cox MD and Alfie Blanca CMA   as a witness.     HPV and the ways it can affect the genitals and cervix were explained to pt as  well today.     PROCEDURE:  Speculum placed in vagina and excellent visualization of cervix   acheived, cervix swabbed x 3 with acetic acid solution.    FINDINGS:  Cervix: no visible lesions, no mosaicism, no punctation, and no abnormal vasculature; cervix swabbed with Lugol's solution, endocervical speculum placed,  endocervical superficial acetowhite lesion noted at 12 o'clock, and hurricaine gel applied to endo and ectocervix.  endocervical curettage performed, specimen labelled and sent to pathology, hemostasis achieved with Monsel's solution,     Vaginal inspection: normal without visible lesions.    Vulvar colposcopy: normal mucosa without lesions.    Procedure Summary: Patient tolerated procedure well and colposcopy adequate.    ASSESSMENT:     ICD-10-CM    1. ASCUS with positive high risk HPV cervical  R87.610 Surgical Pathology Exam    R87.810       2. Personal history of tobacco use, presenting hazards to health  Z87.891            PLAN: Specimens labelled and sent to pathology., Will base further treatment on pathology findings., post biopsy instructions given to patient, and call to discuss Pathology results. see scanned colposcopy written sheet as well.   See AVS as well.          Harmony Cox MD

## 2024-02-23 ENCOUNTER — OFFICE VISIT (OUTPATIENT)
Dept: FAMILY MEDICINE | Facility: CLINIC | Age: 64
End: 2024-02-23
Payer: COMMERCIAL

## 2024-02-23 ENCOUNTER — TRANSFERRED RECORDS (OUTPATIENT)
Dept: FAMILY MEDICINE | Facility: CLINIC | Age: 64
End: 2024-02-23

## 2024-02-23 VITALS
WEIGHT: 227.2 LBS | HEIGHT: 59 IN | DIASTOLIC BLOOD PRESSURE: 88 MMHG | TEMPERATURE: 98.1 F | BODY MASS INDEX: 45.8 KG/M2 | OXYGEN SATURATION: 91 % | HEART RATE: 110 BPM | RESPIRATION RATE: 18 BRPM | SYSTOLIC BLOOD PRESSURE: 132 MMHG

## 2024-02-23 DIAGNOSIS — R87.610 ASCUS WITH POSITIVE HIGH RISK HPV CERVICAL: Primary | ICD-10-CM

## 2024-02-23 DIAGNOSIS — Z87.891 PERSONAL HISTORY OF TOBACCO USE, PRESENTING HAZARDS TO HEALTH: ICD-10-CM

## 2024-02-23 DIAGNOSIS — R87.810 ASCUS WITH POSITIVE HIGH RISK HPV CERVICAL: Primary | ICD-10-CM

## 2024-02-23 LAB — HCG UR QL: NEGATIVE

## 2024-02-23 PROCEDURE — 57454 BX/CURETT OF CERVIX W/SCOPE: CPT | Performed by: FAMILY MEDICINE

## 2024-02-23 PROCEDURE — 88305 TISSUE EXAM BY PATHOLOGIST: CPT | Performed by: PATHOLOGY

## 2024-02-23 PROCEDURE — 81025 URINE PREGNANCY TEST: CPT | Performed by: FAMILY MEDICINE

## 2024-02-23 NOTE — PATIENT INSTRUCTIONS
" [facility]  [address] [place] [address]  Office: [phone]   Fax:    [phone]     Please, call our clinic or go to the ER immediately if signs or symptoms worsen or fail to improve as anticipated.     Thank you so much or choosing [facility]  for your Health Care. It was a pleasure seeing you at your visit today! Please contact us with any questions or concerns you may have.                   Harmony Cox MD                              To reach your [facility] care team after hours call:   [phone] press #2 \"to speak with your care team\".  This will get you to our clinic instead of routing to central [facility]  scheduling.     PLEASE NOTE OUR HOURS HAVE CHANGED secondary to COVID-19 coronavirus pandemic, as we are trying to minimize patient exposure to the virus,  which is now widespread in the [facility].  These hours may change with very little notice.  We apologize for any inconvenience.       Our current clinic hours are:          Monday- Thursday   7:00am - 6:00pm  in person.      Friday  7:00am- 5:00pm                       Saturday and Sunday : Closed to in person and virtual visits        We have telephone and virtual visit times available between    7:00am - 6pm on Monday-Friday as well.                                                Phone:  [phone]      Our pharmacy hours: Monday through Friday 8:00am to 5:00pm                        Saturday - 9:00 am to 12 noon       Sunday : Closed.              Phone:  [phone]              ###  Please note: at this time we are not accepting any walk-in visits. ###      There is also information available at our web site:  [email or web address]    If your provider ordered any lab tests and you do not receive the results within 10 business days, please call the clinic.    If you need a medication refill please contact your pharmacy.  Please allow 3 " business days for your refill to be completed.    Our clinic offers telephone visits and e visits.  Please ask one of your team members to explain more.      Use MyChart (secure email communication and access to your chart) to send your primary care provider a message or make an appointment. Ask someone on your Team how to sign up for [facility].

## 2024-02-26 DIAGNOSIS — E66.01 MORBID OBESITY (H): ICD-10-CM

## 2024-02-26 NOTE — TELEPHONE ENCOUNTER
Medication Question or Refill    Contacts         Type Contact Phone/Fax    02/26/2024 11:02 AM CST Phone (Incoming) Jeannie Barger YEMI (Self) [phone] (H)            What medication are you calling about (include dose and sig)?: tirzepatide (MOUNJARO) 5 MG/0.5ML pen     Preferred Pharmacy:   "[id number] - [place] [address] AT [place] [address] [address] [address]  Phone: [phone] Fax: [phone]      Controlled Substance Agreement on file:   CSA -- Patient Level:    CSA: None found at the patient level.       Who prescribed the medication?: Aliyah Long APRN CNP     Do you need a refill? Yes    When did you use the medication last? Yesterday 02/25/2024    Patient offered an appointment? No    Do you have any questions or concerns?  No      Okay to leave a detailed message?: Yes at Home number on file [phone] (home)

## 2024-02-27 RX ORDER — TIRZEPATIDE 5 MG/.5ML
5 INJECTION, SOLUTION SUBCUTANEOUS
Qty: 2 ML | Refills: 1 | Status: SHIPPED | OUTPATIENT
Start: 2024-02-27 | End: 2024-03-15

## 2024-02-28 LAB
PATH REPORT.COMMENTS IMP SPEC: NORMAL
PATH REPORT.COMMENTS IMP SPEC: NORMAL
PATH REPORT.FINAL DX SPEC: NORMAL
PATH REPORT.GROSS SPEC: NORMAL
PATH REPORT.MICROSCOPIC SPEC OTHER STN: NORMAL
PATH REPORT.RELEVANT HX SPEC: NORMAL
PHOTO IMAGE: NORMAL

## 2024-03-01 NOTE — RESULT ENCOUNTER NOTE
Please call pt with results below:     Endocervical currettings were benign.     For additional lab test information, [email or web address] is an excellent reference.

## 2024-03-11 ENCOUNTER — TELEPHONE (OUTPATIENT)
Dept: FAMILY MEDICINE | Facility: CLINIC | Age: 64
End: 2024-03-11
Payer: COMMERCIAL

## 2024-03-11 ENCOUNTER — NURSE TRIAGE (OUTPATIENT)
Dept: NURSING | Facility: CLINIC | Age: 64
End: 2024-03-11
Payer: COMMERCIAL

## 2024-03-11 NOTE — TELEPHONE ENCOUNTER
Pt is phoning stating that pt had a very bad headache last Tuesday 03/05 and Thursday 03/07    Pt has been feeling a lot of pressure behind her left eye     Pt has never had a migraine before     No fever     Pt is currently not having a headache, but is wanting to be seen     Per disposition: See in Office Today     Pt transferred to scheduling, urgent care options discussed if no appointments available     Care advice given per protocol and when to call back. Pt verbalized understanding and agrees to plan of care.    Halima Morales RN  [place] Nurse Advisor  11:16 AM 3/11/2024            Reason for Disposition   Patient wants to be seen    Additional Information   Negative: Difficult to awaken or acting confused (e.g., disoriented, slurred speech)   Negative: Weakness of the face, arm or leg on one side of the body and new-onset   Negative: Numbness of the face, arm or leg on one side of the body and new-onset   Negative: Loss of speech or garbled speech and new-onset   Negative: Passed out (i.e., fainted, collapsed and was not responding)   Negative: Sounds like a life-threatening emergency to the triager   Negative: Followed a head injury within last 3 days   Negative: Traumatic Brain Injury (TBI) is suspected   Negative: Sinus pain of forehead and yellow or green nasal discharge   Negative: Pregnant   Negative: Unable to walk without falling   Negative: Stiff neck (can't touch chin to chest)   Negative: Possibility of carbon monoxide exposure   Negative: SEVERE headache, states 'worst headache' of life   Negative: SEVERE headache, sudden-onset (i.e., reaching maximum intensity within seconds to 1 hour)   Negative: Severe pain in one eye   Negative: Loss of vision or double vision  (Exception: Same as prior migraines.)   Negative: Patient sounds very sick or weak to the triager   Negative: Fever > 103 F (39.4 C)   Negative: Fever > 100.0 F (37.8 C) and has diabetes mellitus or a weak immune system (e.g., HIV  positive, cancer chemotherapy, organ transplant, splenectomy, chronic steroids)   Negative: SEVERE headache (e.g., excruciating) and has had severe headaches before   Negative: SEVERE headache and not relieved by pain meds   Negative: SEVERE headache and vomiting   Negative: SEVERE headache and fever   Negative: New headache and weak immune system (e.g., HIV positive, cancer chemo, splenectomy, organ transplant, chronic steroids)   Negative: Fever present > 3 days (72 hours)    Protocols used: Headache-A-OH

## 2024-03-11 NOTE — TELEPHONE ENCOUNTER
Pt states triage said to be seen today , would like to discuss, options please advise  regardless  asap [phone] pt at work may leave a message, also wants to discuss results colposcopy feb 23rd

## 2024-03-11 NOTE — TELEPHONE ENCOUNTER
Attempt #1    LM w/pt to call today to see if there would be an appt available.    Please assist.    Veronika WALLACE

## 2024-03-12 NOTE — TELEPHONE ENCOUNTER
Left message to call back. Patient has an appt 3/13/24, but there are some availabilities today (as of right now). When patient calls back please help reschedule appointment today, if there are still availabilities.

## 2024-03-13 ENCOUNTER — OFFICE VISIT (OUTPATIENT)
Dept: PEDIATRICS | Facility: CLINIC | Age: 64
End: 2024-03-13
Payer: COMMERCIAL

## 2024-03-13 ENCOUNTER — ANCILLARY PROCEDURE (OUTPATIENT)
Dept: MRI IMAGING | Facility: CLINIC | Age: 64
End: 2024-03-13
Attending: NURSE PRACTITIONER
Payer: COMMERCIAL

## 2024-03-13 ENCOUNTER — OFFICE VISIT (OUTPATIENT)
Dept: FAMILY MEDICINE | Facility: CLINIC | Age: 64
End: 2024-03-13
Payer: COMMERCIAL

## 2024-03-13 VITALS
BODY MASS INDEX: 43.75 KG/M2 | HEIGHT: 59 IN | TEMPERATURE: 97.6 F | OXYGEN SATURATION: 99 % | RESPIRATION RATE: 11 BRPM | WEIGHT: 217 LBS | HEART RATE: 71 BPM | DIASTOLIC BLOOD PRESSURE: 80 MMHG | SYSTOLIC BLOOD PRESSURE: 139 MMHG

## 2024-03-13 VITALS
HEIGHT: 59 IN | HEART RATE: 65 BPM | OXYGEN SATURATION: 99 % | WEIGHT: 217 LBS | DIASTOLIC BLOOD PRESSURE: 89 MMHG | TEMPERATURE: 97.2 F | SYSTOLIC BLOOD PRESSURE: 172 MMHG | BODY MASS INDEX: 43.75 KG/M2 | RESPIRATION RATE: 26 BRPM

## 2024-03-13 DIAGNOSIS — F17.218 CIGARETTE NICOTINE DEPENDENCE WITH OTHER NICOTINE-INDUCED DISORDER: ICD-10-CM

## 2024-03-13 DIAGNOSIS — E66.01 CLASS 3 SEVERE OBESITY DUE TO EXCESS CALORIES WITH SERIOUS COMORBIDITY AND BODY MASS INDEX (BMI) OF 40.0 TO 44.9 IN ADULT (H): ICD-10-CM

## 2024-03-13 DIAGNOSIS — R51.9 NONINTRACTABLE HEADACHE, UNSPECIFIED CHRONICITY PATTERN, UNSPECIFIED HEADACHE TYPE: ICD-10-CM

## 2024-03-13 DIAGNOSIS — E66.01 CLASS 3 SEVERE OBESITY WITH SERIOUS COMORBIDITY AND BODY MASS INDEX (BMI) OF 40.0 TO 44.9 IN ADULT, UNSPECIFIED OBESITY TYPE (H): ICD-10-CM

## 2024-03-13 DIAGNOSIS — I10 BENIGN ESSENTIAL HYPERTENSION: ICD-10-CM

## 2024-03-13 DIAGNOSIS — E86.0 DEHYDRATION: ICD-10-CM

## 2024-03-13 DIAGNOSIS — Z82.49 FAMILY HISTORY OF BRAIN ANEURYSM: ICD-10-CM

## 2024-03-13 DIAGNOSIS — Z82.49 FAMILY HISTORY OF ANEURYSM: ICD-10-CM

## 2024-03-13 DIAGNOSIS — H53.9 VISION CHANGES: ICD-10-CM

## 2024-03-13 DIAGNOSIS — E11.65 TYPE 2 DIABETES MELLITUS WITH HYPERGLYCEMIA, WITHOUT LONG-TERM CURRENT USE OF INSULIN (H): ICD-10-CM

## 2024-03-13 DIAGNOSIS — R47.9 SPEECH DISTURBANCE, UNSPECIFIED TYPE: ICD-10-CM

## 2024-03-13 DIAGNOSIS — E66.813 CLASS 3 SEVERE OBESITY DUE TO EXCESS CALORIES WITH SERIOUS COMORBIDITY AND BODY MASS INDEX (BMI) OF 40.0 TO 44.9 IN ADULT (H): ICD-10-CM

## 2024-03-13 DIAGNOSIS — R47.9 TRANSIENT SPEECH DISTURBANCE: ICD-10-CM

## 2024-03-13 DIAGNOSIS — E04.1 THYROID NODULE: ICD-10-CM

## 2024-03-13 DIAGNOSIS — R51.9 NONINTRACTABLE HEADACHE, UNSPECIFIED CHRONICITY PATTERN, UNSPECIFIED HEADACHE TYPE: Primary | ICD-10-CM

## 2024-03-13 DIAGNOSIS — I67.9 CEREBROVASCULAR SMALL VESSEL DISEASE: ICD-10-CM

## 2024-03-13 DIAGNOSIS — I10 HTN, GOAL BELOW 130/80: ICD-10-CM

## 2024-03-13 DIAGNOSIS — E66.813 CLASS 3 SEVERE OBESITY WITH SERIOUS COMORBIDITY AND BODY MASS INDEX (BMI) OF 40.0 TO 44.9 IN ADULT, UNSPECIFIED OBESITY TYPE (H): ICD-10-CM

## 2024-03-13 DIAGNOSIS — Z82.49 FAMILY HISTORY OF ANEURYSM: Primary | ICD-10-CM

## 2024-03-13 LAB
ALBUMIN SERPL BCG-MCNC: 4 G/DL (ref 3.5–5.2)
ALBUMIN UR-MCNC: 200 MG/DL
ALP SERPL-CCNC: 87 U/L (ref 40–150)
ALT SERPL W P-5'-P-CCNC: 50 U/L (ref 0–50)
ANION GAP SERPL CALCULATED.3IONS-SCNC: 11 MMOL/L (ref 7–15)
APPEARANCE UR: ABNORMAL
AST SERPL W P-5'-P-CCNC: 33 U/L (ref 0–45)
BACTERIA #/AREA URNS HPF: ABNORMAL /HPF
BASOPHILS # BLD AUTO: 0.1 10E3/UL (ref 0–0.2)
BASOPHILS NFR BLD AUTO: 1 %
BILIRUB SERPL-MCNC: 0.9 MG/DL
BILIRUB UR QL STRIP: NEGATIVE
BUN SERPL-MCNC: 16.1 MG/DL (ref 8–23)
CALCIUM SERPL-MCNC: 9.7 MG/DL (ref 8.8–10.2)
CHLORIDE SERPL-SCNC: 96 MMOL/L (ref 98–107)
COLOR UR AUTO: YELLOW
CREAT SERPL-MCNC: 0.71 MG/DL (ref 0.51–0.95)
CRP SERPL-MCNC: 27 MG/L
DEPRECATED HCO3 PLAS-SCNC: 29 MMOL/L (ref 22–29)
EGFRCR SERPLBLD CKD-EPI 2021: >90 ML/MIN/1.73M2
EOSINOPHIL # BLD AUTO: 0.2 10E3/UL (ref 0–0.7)
EOSINOPHIL NFR BLD AUTO: 1 %
ERYTHROCYTE [DISTWIDTH] IN BLOOD BY AUTOMATED COUNT: 15.8 % (ref 10–15)
ERYTHROCYTE [SEDIMENTATION RATE] IN BLOOD BY WESTERGREN METHOD: 2 MM/HR (ref 0–30)
GLUCOSE SERPL-MCNC: 115 MG/DL (ref 70–99)
GLUCOSE UR STRIP-MCNC: NEGATIVE MG/DL
HCT VFR BLD AUTO: 57.5 % (ref 35–47)
HGB BLD-MCNC: 18.7 G/DL (ref 11.7–15.7)
HGB UR QL STRIP: NEGATIVE
IMM GRANULOCYTES # BLD: 0.1 10E3/UL
IMM GRANULOCYTES NFR BLD: 1 %
KETONES UR STRIP-MCNC: NEGATIVE MG/DL
LEUKOCYTE ESTERASE UR QL STRIP: NEGATIVE
LYMPHOCYTES # BLD AUTO: 2.5 10E3/UL (ref 0.8–5.3)
LYMPHOCYTES NFR BLD AUTO: 23 %
MCH RBC QN AUTO: 28.9 PG (ref 26.5–33)
MCHC RBC AUTO-ENTMCNC: 32.5 G/DL (ref 31.5–36.5)
MCV RBC AUTO: 89 FL (ref 78–100)
MONOCYTES # BLD AUTO: 0.9 10E3/UL (ref 0–1.3)
MONOCYTES NFR BLD AUTO: 8 %
MUCOUS THREADS #/AREA URNS LPF: PRESENT /LPF
NEUTROPHILS # BLD AUTO: 7.5 10E3/UL (ref 1.6–8.3)
NEUTROPHILS NFR BLD AUTO: 67 %
NITRATE UR QL: NEGATIVE
NRBC # BLD AUTO: 0 10E3/UL
NRBC BLD AUTO-RTO: 0 /100
PH UR STRIP: 6.5 [PH] (ref 5–7)
PLATELET # BLD AUTO: 316 10E3/UL (ref 150–450)
POTASSIUM SERPL-SCNC: 4 MMOL/L (ref 3.4–5.3)
PROT SERPL-MCNC: 7.4 G/DL (ref 6.4–8.3)
RBC # BLD AUTO: 6.47 10E6/UL (ref 3.8–5.2)
RBC #/AREA URNS AUTO: ABNORMAL /HPF
SODIUM SERPL-SCNC: 136 MMOL/L (ref 135–145)
SP GR UR STRIP: 1.03 (ref 1–1.03)
SQUAMOUS #/AREA URNS AUTO: ABNORMAL /LPF
TSH SERPL DL<=0.005 MIU/L-ACNC: 1.13 UIU/ML (ref 0.3–4.2)
UROBILINOGEN UR STRIP-MCNC: 2 MG/DL
WBC # BLD AUTO: 11.2 10E3/UL (ref 4–11)
WBC #/AREA URNS AUTO: ABNORMAL /HPF

## 2024-03-13 PROCEDURE — 99207 REFERRAL TO ACUTE AND DIAGNOSTIC SERVICES: CPT | Performed by: NURSE PRACTITIONER

## 2024-03-13 PROCEDURE — 36415 COLL VENOUS BLD VENIPUNCTURE: CPT | Performed by: NURSE PRACTITIONER

## 2024-03-13 PROCEDURE — 80053 COMPREHEN METABOLIC PANEL: CPT | Performed by: NURSE PRACTITIONER

## 2024-03-13 PROCEDURE — 70553 MRI BRAIN STEM W/O & W/DYE: CPT | Performed by: RADIOLOGY

## 2024-03-13 PROCEDURE — A9585 GADOBUTROL INJECTION: HCPCS | Performed by: RADIOLOGY

## 2024-03-13 PROCEDURE — 84443 ASSAY THYROID STIM HORMONE: CPT | Performed by: NURSE PRACTITIONER

## 2024-03-13 PROCEDURE — 96360 HYDRATION IV INFUSION INIT: CPT | Performed by: NURSE PRACTITIONER

## 2024-03-13 PROCEDURE — 99417 PROLNG OP E/M EACH 15 MIN: CPT | Performed by: NURSE PRACTITIONER

## 2024-03-13 PROCEDURE — 86140 C-REACTIVE PROTEIN: CPT | Performed by: NURSE PRACTITIONER

## 2024-03-13 PROCEDURE — 70549 MR ANGIOGRAPH NECK W/O&W/DYE: CPT | Performed by: RADIOLOGY

## 2024-03-13 PROCEDURE — 81001 URINALYSIS AUTO W/SCOPE: CPT | Performed by: NURSE PRACTITIONER

## 2024-03-13 PROCEDURE — 85025 COMPLETE CBC W/AUTO DIFF WBC: CPT | Performed by: NURSE PRACTITIONER

## 2024-03-13 PROCEDURE — 85652 RBC SED RATE AUTOMATED: CPT | Performed by: NURSE PRACTITIONER

## 2024-03-13 PROCEDURE — 99215 OFFICE O/P EST HI 40 MIN: CPT | Mod: 25 | Performed by: NURSE PRACTITIONER

## 2024-03-13 RX ORDER — DILTIAZEM HYDROCHLORIDE 240 MG/1
CAPSULE, EXTENDED RELEASE ORAL DAILY
Status: CANCELLED | OUTPATIENT
Start: 2024-03-13

## 2024-03-13 RX ORDER — RIZATRIPTAN BENZOATE 5 MG/1
5 TABLET ORAL
Qty: 20 TABLET | Refills: 0 | Status: SHIPPED | OUTPATIENT
Start: 2024-03-13 | End: 2024-04-09

## 2024-03-13 RX ORDER — GADOBUTROL 604.72 MG/ML
10 INJECTION INTRAVENOUS ONCE
Status: COMPLETED | OUTPATIENT
Start: 2024-03-13 | End: 2024-03-13

## 2024-03-13 RX ADMIN — Medication 1000 ML: at 14:59

## 2024-03-13 RX ADMIN — GADOBUTROL 10 ML: 604.72 INJECTION INTRAVENOUS at 14:46

## 2024-03-13 ASSESSMENT — ENCOUNTER SYMPTOMS: HEADACHES: 1

## 2024-03-13 ASSESSMENT — PAIN SCALES - GENERAL: PAINLEVEL: NO PAIN (0)

## 2024-03-13 NOTE — LETTER
March 13, 2024      Jeannie Barger  [address] [place] [address]        To Whom It May Concern:    Jeannie Barger  was seen on 3/13/24.  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 We are  hoping to have that answer today.         Sincerely,         RAFAEL Vance CNP

## 2024-03-13 NOTE — LETTER
March 13, 2024      Jeannie Barger  [address] [place] [address]        Dear ,    We are writing to inform you of your test results.      Endocervical currettings were benign.     For additional lab test information, [email or web address] is an excellent reference.      If you have any questions or concerns, please call the clinic at the number listed above.       Sincerely,             Harmony Cox M.D./ramesh

## 2024-03-13 NOTE — PROGRESS NOTES
Assessment & Plan   Problem List Items Addressed This Visit       Obesity    Benign essential hypertension     Other Visit Diagnoses       Family history of aneurysm    -  Primary    Relevant Orders    MRA Brain ([place]) w/o Contrast (Completed)    MRA Neck ([facility]) w/o & w Contrast (Completed)    MR Brain w/o & w Contrast (Completed)    CBC with platelets differential (Completed)    Comprehensive metabolic panel (Completed)    CRP inflammation (Completed)    Erythrocyte sedimentation rate auto (Completed)    TSH with free T4 reflex (Completed)    UA with Microscopic reflex to Culture (Completed)    UA Microscopic with Reflex to Culture (Completed)    Nonintractable headache, unspecified chronicity pattern, unspecified headache type        Relevant Medications    rizatriptan (MAXALT) 5 MG tablet    Other Relevant Orders    MRA Brain ([place] [facility] [place]) w/o Contrast (Completed)    MRA Neck ([facility]) w/o & w Contrast (Completed)    MR Brain w/o & w Contrast (Completed)    CBC with platelets differential (Completed)    Comprehensive metabolic panel (Completed)    CRP inflammation (Completed)    Erythrocyte sedimentation rate auto (Completed)    TSH with free T4 reflex (Completed)    UA with Microscopic reflex to Culture (Completed)    UA Microscopic with Reflex to Culture (Completed)    Dehydration        Relevant Medications    sodium chloride 0.9% BOLUS 1,000 mL (Completed)    Cerebrovascular small vessel disease        Transient speech disturbance        Thyroid nodule        Cigarette nicotine dependence with other nicotine-induced disorder             64-year-old female patient presents to [facility] at the request of her primary for non-intractable headache with recent history of speech disturbance, vision changes history of brain aneurysm hypertension type 2 diabetes class III obesity.  Patient indicated that on 3/5/2024 she returned home after work developed a frontal headache reportedly was the worst  headache she had ever experienced without any known triggers patient did lie down began having nausea and vomiting and according to her significant other who is also present had some speech disturbances patient also reported having an aura.  She reports the symptoms lasted several hours and the headache remained for 24 hours then resolved 48 hours later her symptoms returned she developed an aura on the left side and throbbing on the left temporal region again did have some nausea and vomiting though no speech disturbances to take some Excedrin Migraine and found mild relief.  Patient reports a family history of brain aneurysm in her mother as they were advised more than 10 years ago.     Physical exam is unremarkable imaging shows chronic microvascular disease and a stable thyroid nodule with recommendation to undergo thyroid ultrasound.  Results reviewed with patient all questions were addressed patient be seen in follow-up in primary care in 2 days time.    Hyperlipidemia: Recommend following a low cholesterol diet, 81mg aspirin and starting a high dose statin medication    Chronic microvascular disease and new onset headaches: Patient was requesting medication she is prescribed Maxalt as needed 5 mg.  Follow up with Neurology    Thyroid Nodule: recommend follow up with primary for thyroid ultrasound    Continue working with primary on ALTHIA, you are doing a great job!  Encouraged 30 minutes purposeful physical activity most days of the week    Diabetes: recommend continuing on Mounjaro as prescribed     Hypertension: Currently not under good control recommend follow up with primary and follow a low sodium diet.  Discussed with patient goal blood pressure is less than 120/80.    Nicotine dependency offered assistance patient declined at this time will follow-up with primary with recommendation to discontinue    126 minutes spent by me on the date of the encounter doing chart review, review of test results,  "interpretation of tests, patient visit, documentation, and discussion with other provider(s)      BMI  Estimated body mass index is 43.83 kg/m  as calculated from the following:    Height as of this encounter: 1.499 m (4' 11\").    Weight as of this encounter: 98.4 kg (217 lb).           No follow-ups on file.      Ronda Dennis is a 64 year old, presenting for the following health issues:  Headache        3/13/2024     9:46 AM   Additional Questions   Roomed by Alfie GARCIA   Accompanied by Self     Headache          Headache  Onset/Duration: Started Tuesday, Lasted 12 hours   Description:   Location: bilateral in the temporal area   Character: throbbing pain  Frequency:  Tuesday had initial Headache with speech changes,  Wednesday Dull headache (full pressure) , Thursday had Aura (Left sided), still having dull headache   Duration:  Dull headache since Tuesday   Wake with headaches:  no   Able to do daily activities when headache present:  YES- except Tuesday and Thursday has been able to do ADLs with headache   Intensity: severe (Tuesday's)  Progression of Symptoms:  improving  Accompanying Signs & Symptoms:  Stiff neck: YES  Neck or upper back pain: YES           Sinus or URI symptoms: YES  Fever: no   Nausea or vomiting: YES  Dizziness: YES  Numbness/tingling: no   Weakness: YES - general Body Weakness   Visual changes: Unsure   History:   Head trauma: no   Family history of migraines: YES- Sister has migraines, Mom had an aneurysm   Daily pain medication use: no            Previous tests for headaches: no            Neurologist evaluations: no   Precipitating or alleviating factors:   Does light make it worse: YES- Tuesday and Thursday   Does sound make it worse: Unsure   Does sleep help: YES- When Pt was able to sleep   Therapies Tried and outcome: Excedrin and Cold Packs         Review of Systems  Constitutional, HEENT, cardiovascular, pulmonary, GI, , musculoskeletal, neuro, skin, endocrine and psych " "systems are negative, except as otherwise noted.      Objective    BP (!) 172/89 (BP Location: Left arm)   Pulse 65   Temp 97.2  F (36.2  C) (Oral)   Resp 26   Ht 1.499 m (4' 11\")   Wt 98.4 kg (217 lb)   LMP  (LMP Unknown)   SpO2 99%   BMI 43.83 kg/m    Body mass index is 43.83 kg/m .  Physical Exam   GENERAL: alert and no distress  EYES: Eyes grossly normal to inspection, PERRL and conjunctivae and sclerae normal  HENT:   nose and mouth without ulcers or lesions  NECK: no adenopathy   RESP: lungs clear to auscultation - no rales, rhonchi or wheezes  CV: regular rate and rhythm, normal S1 S2, soft systolic murmur appreciated, no peripheral edema  MS: no gross musculoskeletal defects noted, no edema  NEURO: Normal strength and tone, mentation intact and speech normal cranial nerves II through XII are grossly intact reflexes are 1+ and symmetrical  PSYCH: mentation appears normal, affect normal/bright    Results for orders placed or performed in visit on 03/13/24   MR Brain w/o & w Contrast     Status: None    Narrative    EXAM: MR BRAIN W/O & W CONTRAST, MRA NECK ([facility]) W/O   W CONTRAST, MRA BRAIN ([place]) W/O CONTRAST  3/13/2024 2:57  PM     HISTORY:  Headache; Neurologic deficit, non-traumatic; Visual  symptoms; Neurologic deficit onset <= 24 hours; No known/automatically  detected potential contraindications to iodinated contrast; Family  history of aneurysm; Nonintractable headache, unspecified chronicity  pattern, unspecified headache type       COMPARISON:  None available.    TECHNIQUE:  Brain MRI:  Axial diffusion, FLAIR, T2-weighted, susceptibility, and  coronal T1-weighted images were obtained without intravenous contrast.  Following intravenous gadolinium-based contrast administration, axial  and coronal T1-weighted images were obtained.    Head MRA: 3D time-of-flight MRA of the [place] was performed  without intravenous contrast.  Neck MRA:  Limited non contrast 2DTOF images " were obtained of the  mid-cervical region in addition to axial fat-saturated T1-weighted  images of the neck. Following intravenous gadolinium-based contrast  administration, a contrast enhanced MRA of the neck/cervical vessels  was performed.  Three-dimensional reconstructions of the neck and head MRA were  created, which were reviewed by the radiologist.    CONTRAST: 10ml Gadavist.    FINDINGS:  There is no mass effect, midline shift, or intracranial hemorrhage.  The ventricles are proportionate to the cerebral sulci. No restricted  diffusion. Moderate leukoaraiosis of the bilateral cerebral  hemispheres and brainstem.    No abnormal intracranial enhancement.    No suspicious abnormality of the skull marrow signal. Clear paranasal  sinuses. Mastoid air cells are clear. The orbits are normal.    Brain MRA: No intracranial arterial aneurysm or stenosis. Patent  anterior communicating artery. Patent bilateral posterior commuting  arteries. Normal variant hypoplastic A1 segment of the right anterior  cerebral artery.    Neck MRA: Patent great vessel origins arising from the aortic arch. No  carotid or vertebral artery stenosis.    Heterogeneous thyroid gland with a midline thyroid nodule measuring up  to 2.4 cm, also present on 7/11/2023 though more conspicuous on this  examination.      Impression    IMPRESSION:  1. No acute intracranial pathology.  2. Moderate chronic small vessel ischemic disease.  3. Brain MRA demonstrates no intracranial arterial aneurysm or  stenosis.  4. Neck MRA demonstrates no carotid or vertebral artery stenosis.  5. Midline thyroid 2.4 cm nodule. Recommend correlation with dedicated  thyroid ultrasound examination.    RHETT BUSTILLO MD         SYSTEM ID:  [id number]   Results for orders placed or performed in visit on 03/13/24   Comprehensive metabolic panel     Status: Abnormal   Result Value Ref Range    Sodium 136 135 - 145 mmol/L    Potassium 4.0 3.4 - 5.3 mmol/L    Carbon Dioxide (CO2)  29 22 - 29 mmol/L    Anion Gap 11 7 - 15 mmol/L    Urea Nitrogen 16.1 8.0 - 23.0 mg/dL    Creatinine 0.71 0.51 - 0.95 mg/dL    GFR Estimate >90 >60 mL/min/1.73m2    Calcium 9.7 8.8 - 10.2 mg/dL    Chloride 96 (L) 98 - 107 mmol/L    Glucose 115 (H) 70 - 99 mg/dL    Alkaline Phosphatase 87 40 - 150 U/L    AST 33 0 - 45 U/L    ALT 50 0 - 50 U/L    Protein Total 7.4 6.4 - 8.3 g/dL    Albumin 4.0 3.5 - 5.2 g/dL    Bilirubin Total 0.9 <=1.2 mg/dL   CRP inflammation     Status: Abnormal   Result Value Ref Range    CRP Inflammation 27.00 (H) <5.00 mg/L   Erythrocyte sedimentation rate auto     Status: Normal   Result Value Ref Range    Erythrocyte Sedimentation Rate 2 0 - 30 mm/hr   TSH with free T4 reflex     Status: Normal   Result Value Ref Range    TSH 1.13 0.30 - 4.20 uIU/mL   UA with Microscopic reflex to Culture     Status: Abnormal    Specimen: Urine, Clean Catch   Result Value Ref Range    Color Urine Yellow Colorless, Straw, Light Yellow, Yellow    Appearance Urine Slightly Cloudy (A) Clear    Glucose Urine Negative Negative mg/dL    Bilirubin Urine Negative Negative    Ketones Urine Negative Negative mg/dL    Specific Gravity Urine 1.026 0.999 - 1.035    Blood Urine Negative Negative    pH Urine 6.5 5.0 - 7.0    Protein Albumin Urine 200 (A) Negative mg/dL    Nitrite Urine Negative Negative    Leukocyte Esterase Urine Negative Negative    Urobilinogen Urine 2.0 Normal, 2.0 mg/dL   CBC with platelets and differential     Status: Abnormal   Result Value Ref Range    WBC Count 11.2 (H) 4.0 - 11.0 10e3/uL    RBC Count 6.47 (H) 3.80 - 5.20 10e6/uL    Hemoglobin 18.7 (H) 11.7 - 15.7 g/dL    Hematocrit 57.5 (H) 35.0 - 47.0 %    MCV 89 78 - 100 fL    MCH 28.9 26.5 - 33.0 pg    MCHC 32.5 31.5 - 36.5 g/dL    RDW 15.8 (H) 10.0 - 15.0 %    Platelet Count 316 150 - 450 10e3/uL    % Neutrophils 67 %    % Lymphocytes 23 %    % Monocytes 8 %    % Eosinophils 1 %    % Basophils 1 %    % Immature Granulocytes 1 %    NRBCs per 100  WBC 0 <1 /100    Absolute Neutrophils 7.5 1.6 - 8.3 10e3/uL    Absolute Lymphocytes 2.5 0.8 - 5.3 10e3/uL    Absolute Monocytes 0.9 0.0 - 1.3 10e3/uL    Absolute Eosinophils 0.2 0.0 - 0.7 10e3/uL    Absolute Basophils 0.1 0.0 - 0.2 10e3/uL    Absolute Immature Granulocytes 0.1 <=0.4 10e3/uL    Absolute NRBCs 0.0 10e3/uL   UA Microscopic with Reflex to Culture     Status: Abnormal   Result Value Ref Range    Bacteria Urine Few (A) None Seen /HPF    RBC Urine 0-2 0-2 /HPF /HPF    WBC Urine 0-5 0-5 /HPF /HPF    Squamous Epithelials Urine Moderate (A) None Seen /LPF    Mucus Urine Present (A) None Seen /LPF    Narrative    Urine Culture not indicated   CBC with platelets differential     Status: Abnormal    Narrative    The following orders were created for panel order CBC with platelets differential.  Procedure                               Abnormality         Status                     ---------                               -----------         ------                     CBC with platelets and d...[[id number]]  Abnormal            Final result                 Please view results for these tests on the individual orders.   Results for orders placed or performed in visit on 03/13/24   MRA Neck ([facility]) w/o & w Contrast     Status: None    Narrative    EXAM: MR BRAIN W/O & W CONTRAST, MRA NECK ([facility]) W/O   W CONTRAST, MRA BRAIN ([place] [facility] [place]) W/O CONTRAST  3/13/2024 2:57  PM     HISTORY:  Headache; Neurologic deficit, non-traumatic; Visual  symptoms; Neurologic deficit onset <= 24 hours; No known/automatically  detected potential contraindications to iodinated contrast; Family  history of aneurysm; Nonintractable headache, unspecified chronicity  pattern, unspecified headache type       COMPARISON:  None available.    TECHNIQUE:  Brain MRI:  Axial diffusion, FLAIR, T2-weighted, susceptibility, and  coronal T1-weighted images were obtained without intravenous contrast.  Following intravenous gadolinium-based  contrast administration, axial  and coronal T1-weighted images were obtained.    Head MRA: 3D time-of-flight MRA of the [place] was performed  without intravenous contrast.  Neck MRA:  Limited non contrast 2DTOF images were obtained of the  mid-cervical region in addition to axial fat-saturated T1-weighted  images of the neck. Following intravenous gadolinium-based contrast  administration, a contrast enhanced MRA of the neck/cervical vessels  was performed.  Three-dimensional reconstructions of the neck and head MRA were  created, which were reviewed by the radiologist.    CONTRAST: 10ml Gadavist.    FINDINGS:  There is no mass effect, midline shift, or intracranial hemorrhage.  The ventricles are proportionate to the cerebral sulci. No restricted  diffusion. Moderate leukoaraiosis of the bilateral cerebral  hemispheres and brainstem.    No abnormal intracranial enhancement.    No suspicious abnormality of the skull marrow signal. Clear paranasal  sinuses. Mastoid air cells are clear. The orbits are normal.    Brain MRA: No intracranial arterial aneurysm or stenosis. Patent  anterior communicating artery. Patent bilateral posterior commuting  arteries. Normal variant hypoplastic A1 segment of the right anterior  cerebral artery.    Neck MRA: Patent great vessel origins arising from the aortic arch. No  carotid or vertebral artery stenosis.    Heterogeneous thyroid gland with a midline thyroid nodule measuring up  to 2.4 cm, also present on 7/11/2023 though more conspicuous on this  examination.      Impression    IMPRESSION:  1. No acute intracranial pathology.  2. Moderate chronic small vessel ischemic disease.  3. Brain MRA demonstrates no intracranial arterial aneurysm or  stenosis.  4. Neck MRA demonstrates no carotid or vertebral artery stenosis.  5. Midline thyroid 2.4 cm nodule. Recommend correlation with dedicated  thyroid ultrasound examination.    RHETT BUSTILLO MD         SYSTEM ID:  [id number]    Results for orders placed or performed in visit on 03/13/24   MRA Brain ([place]) w/o Contrast     Status: None    Narrative    EXAM: MR BRAIN W/O & W CONTRAST, MRA NECK ([facility]) W/O   W CONTRAST, MRA BRAIN ([place]) W/O CONTRAST  3/13/2024 2:57  PM     HISTORY:  Headache; Neurologic deficit, non-traumatic; Visual  symptoms; Neurologic deficit onset <= 24 hours; No known/automatically  detected potential contraindications to iodinated contrast; Family  history of aneurysm; Nonintractable headache, unspecified chronicity  pattern, unspecified headache type       COMPARISON:  None available.    TECHNIQUE:  Brain MRI:  Axial diffusion, FLAIR, T2-weighted, susceptibility, and  coronal T1-weighted images were obtained without intravenous contrast.  Following intravenous gadolinium-based contrast administration, axial  and coronal T1-weighted images were obtained.    Head MRA: 3D time-of-flight MRA of the [place] was performed  without intravenous contrast.  Neck MRA:  Limited non contrast 2DTOF images were obtained of the  mid-cervical region in addition to axial fat-saturated T1-weighted  images of the neck. Following intravenous gadolinium-based contrast  administration, a contrast enhanced MRA of the neck/cervical vessels  was performed.  Three-dimensional reconstructions of the neck and head MRA were  created, which were reviewed by the radiologist.    CONTRAST: 10ml Gadavist.    FINDINGS:  There is no mass effect, midline shift, or intracranial hemorrhage.  The ventricles are proportionate to the cerebral sulci. No restricted  diffusion. Moderate leukoaraiosis of the bilateral cerebral  hemispheres and brainstem.    No abnormal intracranial enhancement.    No suspicious abnormality of the skull marrow signal. Clear paranasal  sinuses. Mastoid air cells are clear. The orbits are normal.    Brain MRA: No intracranial arterial aneurysm or stenosis. Patent  anterior communicating artery.  Patent bilateral posterior commuting  arteries. Normal variant hypoplastic A1 segment of the right anterior  cerebral artery.    Neck MRA: Patent great vessel origins arising from the aortic arch. No  carotid or vertebral artery stenosis.    Heterogeneous thyroid gland with a midline thyroid nodule measuring up  to 2.4 cm, also present on 7/11/2023 though more conspicuous on this  examination.      Impression    IMPRESSION:  1. No acute intracranial pathology.  2. Moderate chronic small vessel ischemic disease.  3. Brain MRA demonstrates no intracranial arterial aneurysm or  stenosis.  4. Neck MRA demonstrates no carotid or vertebral artery stenosis.  5. Midline thyroid 2.4 cm nodule. Recommend correlation with dedicated  thyroid ultrasound examination.    RHETT BUSTILLO MD         SYSTEM ID:  [id number]     Results for orders placed or performed in visit on 03/13/24 (from the past 24 hour(s))   CBC with platelets differential    Narrative    The following orders were created for panel order CBC with platelets differential.  Procedure                               Abnormality         Status                     ---------                               -----------         ------                     CBC with platelets and d...[[id number]]  Abnormal            Final result                 Please view results for these tests on the individual orders.   Comprehensive metabolic panel   Result Value Ref Range    Sodium 136 135 - 145 mmol/L    Potassium 4.0 3.4 - 5.3 mmol/L    Carbon Dioxide (CO2) 29 22 - 29 mmol/L    Anion Gap 11 7 - 15 mmol/L    Urea Nitrogen 16.1 8.0 - 23.0 mg/dL    Creatinine 0.71 0.51 - 0.95 mg/dL    GFR Estimate >90 >60 mL/min/1.73m2    Calcium 9.7 8.8 - 10.2 mg/dL    Chloride 96 (L) 98 - 107 mmol/L    Glucose 115 (H) 70 - 99 mg/dL    Alkaline Phosphatase 87 40 - 150 U/L    AST 33 0 - 45 U/L    ALT 50 0 - 50 U/L    Protein Total 7.4 6.4 - 8.3 g/dL    Albumin 4.0 3.5 - 5.2 g/dL    Bilirubin Total 0.9 <=1.2  mg/dL   CRP inflammation   Result Value Ref Range    CRP Inflammation 27.00 (H) <5.00 mg/L   Erythrocyte sedimentation rate auto   Result Value Ref Range    Erythrocyte Sedimentation Rate 2 0 - 30 mm/hr   TSH with free T4 reflex   Result Value Ref Range    TSH 1.13 0.30 - 4.20 uIU/mL   CBC with platelets and differential   Result Value Ref Range    WBC Count 11.2 (H) 4.0 - 11.0 10e3/uL    RBC Count 6.47 (H) 3.80 - 5.20 10e6/uL    Hemoglobin 18.7 (H) 11.7 - 15.7 g/dL    Hematocrit 57.5 (H) 35.0 - 47.0 %    MCV 89 78 - 100 fL    MCH 28.9 26.5 - 33.0 pg    MCHC 32.5 31.5 - 36.5 g/dL    RDW 15.8 (H) 10.0 - 15.0 %    Platelet Count 316 150 - 450 10e3/uL    % Neutrophils 67 %    % Lymphocytes 23 %    % Monocytes 8 %    % Eosinophils 1 %    % Basophils 1 %    % Immature Granulocytes 1 %    NRBCs per 100 WBC 0 <1 /100    Absolute Neutrophils 7.5 1.6 - 8.3 10e3/uL    Absolute Lymphocytes 2.5 0.8 - 5.3 10e3/uL    Absolute Monocytes 0.9 0.0 - 1.3 10e3/uL    Absolute Eosinophils 0.2 0.0 - 0.7 10e3/uL    Absolute Basophils 0.1 0.0 - 0.2 10e3/uL    Absolute Immature Granulocytes 0.1 <=0.4 10e3/uL    Absolute NRBCs 0.0 10e3/uL   UA with Microscopic reflex to Culture    Specimen: Urine, Clean Catch   Result Value Ref Range    Color Urine Yellow Colorless, Straw, Light Yellow, Yellow    Appearance Urine Slightly Cloudy (A) Clear    Glucose Urine Negative Negative mg/dL    Bilirubin Urine Negative Negative    Ketones Urine Negative Negative mg/dL    Specific Gravity Urine 1.026 0.999 - 1.035    Blood Urine Negative Negative    pH Urine 6.5 5.0 - 7.0    Protein Albumin Urine 200 (A) Negative mg/dL    Nitrite Urine Negative Negative    Leukocyte Esterase Urine Negative Negative    Urobilinogen Urine 2.0 Normal, 2.0 mg/dL   UA Microscopic with Reflex to Culture   Result Value Ref Range    Bacteria Urine Few (A) None Seen /HPF    RBC Urine 0-2 0-2 /HPF /HPF    WBC Urine 0-5 0-5 /HPF /HPF    Squamous Epithelials Urine Moderate (A) None  Seen /LPF    Mucus Urine Present (A) None Seen /LPF    Narrative    Urine Culture not indicated         The 10-year ASCVD risk score (Neeta DK, et al., 2019) is: 20.1%    Values used to calculate the score:      Age: 64 years      Sex: Female      Is Non- : No      Diabetic: Yes      Tobacco smoker: No      Systolic Blood Pressure: 172 mmHg      Is BP treated: Yes      HDL Cholesterol: 49 mg/dL      Total Cholesterol: 189 mg/dL  BP Readings from Last 6 Encounters:   03/13/24 (!) 172/89   03/13/24 139/80   02/23/24 132/88   01/26/24 132/88   12/12/23 (!) 128/92   07/11/23 (!) 193/76     Wt Readings from Last 5 Encounters:   03/13/24 98.4 kg (217 lb)   03/13/24 98.4 kg (217 lb)   02/23/24 103.1 kg (227 lb 3.2 oz)   01/26/24 107 kg (236 lb)   12/12/23 113.9 kg (251 lb)         Current Outpatient Medications:     albuterol (PROAIR HFA/PROVENTIL HFA/VENTOLIN HFA) 108 (90 Base) MCG/ACT inhaler, Inhale 2 puffs into the lungs every 4 hours as needed for shortness of breath, wheezing or cough, Disp: 18 g, Rfl: 1    atenolol-chlorthalidone (TENORETIC) 100-25 MG tablet, Take 1 tablet by mouth daily, Disp: 90 tablet, Rfl: 3    diltiazem ER (DILT-XR) 180 MG 24 hr capsule, Take 1 capsule (180 mg) by mouth daily, Disp: 90 capsule, Rfl: 3    rizatriptan (MAXALT) 5 MG tablet, Take 1 tablet (5 mg) by mouth at onset of headache for migraine May repeat in 2 hours. Max 6 tablets/24 hours., Disp: 20 tablet, Rfl: 0    sertraline (ZOLOFT) 50 MG tablet, Take 1 tablet (50 mg) by mouth daily, Disp: 30 tablet, Rfl: 2    tirzepatide (MOUNJARO) 5 MG/0.5ML pen, Inject 5 mg Subcutaneous every 7 days, Disp: 2 mL, Rfl: 1  No current facility-administered medications for this visit.    Signed Electronically by: Shira Hair NP

## 2024-03-13 NOTE — PATIENT INSTRUCTIONS
Hyperlipidemia: Recommend following a low cholesterol diet, 81mg aspirin and starting a high dose statin medication    Chronic microvascular disease: Follow up with Neurology    Thyroid Nodule: recommend follow up with primary for thyroid ultrasound    Continue working with primary on mangofizz jobs, you are doing a great job!     Diabetes: recommend continuing on Mounjaro as prescribed     Hypertension: follow up with primary and follow a low sodium diet.

## 2024-03-13 NOTE — PROGRESS NOTES
"  Assessment & Plan     Nonintractable headache, unspecified chronicity pattern, unspecified headache type  Speech disturbance, unspecified type  Vision changes  Family history of brain aneurysm  HTN, goal below 130/80  Type 2 diabetes mellitus with hyperglycemia, without long-term current use of insulin (H)  Class 3 severe obesity due to excess calories with serious comorbidity and body mass index (BMI) of 40.0 to 44.9 in adult (H)  NO acute findings today warranting ED but would appreciate MRI today to rule out something sinister going on. Felt high risk with no previous history of headaches/migraines,Hypertension obesity, diabetes,  family history of aneurysm and stroke.   Has  to drive her.   MRI not available at [facility] or [facility] is available. Would appreciate lab work as well.  Called [facility] for referral and they graciously accept. Patient given address to location. Advised to go straight there. No food to be consumed. Follow up and plan of care based on [facility] findings.   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  Red flag symptoms discussed and if these occur present to the emergency room or call 911.   Jeannie verbalizes understanding of plan of care and is in agreement.    - Referral to Acute and Diagnostic Services (Day of diagnostic / First order acute)      BMI  Estimated body mass index is 43.83 kg/m  as calculated from the following:    Height as of this encounter: 1.499 m (4' 11\").    Weight as of this encounter: 98.4 kg (217 lb).         Return today (on 3/13/2024) for [facility] [place] now.     Subjective   Jeannie is a 64 year old, presenting for the following health issues:  Headache        3/13/2024     9:46 AM   Additional Questions   Roomed by Alfie GARCIA   Accompanied by  - Rupert     \"Triaged\" 03/11/2024 - 11:16a  Pt is phoning stating that pt had a very bad headache last Tuesday 03/05 and Thursday 03/07  Pt has been feeling a lot of pressure " "behind her left eye    Pt has never had a migraine before    No fever    Pt is currently not having a headache, but is wanting to be seen    Per disposition: See in Office Today   Pt transferred to scheduling, urgent care options discussed if no appointments available   Care advice given per protocol and when to call back. Pt verbalized understanding and agrees to plan of care.   Halima Morales RN  [place] Nurse Advisor  11:16 AM 3/11/2024            Referral to Acute and Diagnostic Services    [facility] has been contacted at [phone] ([place]) to confirm patient acceptance. The transition to Acute & Diagnostic Services Clinic has been discussed with patient, and she agrees with next level of care.  Patient understands that evaluation/treatment at [facility] typically takes significantly longer than in clinic/urgent care (>2 hours).        Special issues:  None          Referral placed: Yes  Patient has transportation arranged and will travel to the [facility] without delay: Yes  Patient aware not to eat or drink. Yes    The following provider has assessed this patient for intervention at [facility], and directed the patient for referral: RAFAEL Vance CNP        History of Present Illness       Headaches:   Since the patient's last clinic visit, headaches are: worsened  The patient is getting headaches:  Constant pressure  She is not able to do normal daily activities when she has a migraine.  The patient is taking the following rescue/relief medications:  Excedrin   Patient states \"I get no relief\" from the rescue/relief medications.   The patient is taking the following medications to prevent migraines:  No medications to prevent migraines  In the past 4 weeks, the patient has gone to an Urgent Care or Emergency Room 0 times times due to headaches.    She eats 2-3 servings of fruits and vegetables daily.She consumes 0 sweetened beverage(s) daily.She exercises with " enough effort to increase her heart rate 9 or less minutes per day.  She exercises with enough effort to increase her heart rate 3 or less days per week.   She is taking medications regularly.     Headache -- Currently a dull ache -- pressure - around head - sinus area/under eyes.  Eye Doctor appt recently - all were good.  Onset: 8 day(s) ago -- not daily had last week Tues, throbbing Wed and Thurs-went to work started in the afternoon - flashes in eyes, sensitivity to light  Description:                Type: 2 headaches                  Location: Came on so fast couldn't tell where pain was coming from -- Left eye thurs  Character: throbbing pain, dull pain-currently since Thursday                 Frequency:  2 times                  Duration:  all day approx 12 hours                 Pain scale rating:10/10                 Are headaches getting more intense or more frequent: no - just came on and didn't go away for hours   Precipitating and/or Alleviating factors:        How much caffeine do you use daily: YES- 3-4 cups of coffee and 2-3 cans diet coke  Does movement, bending over, increase pain: does not remember  Does light/sound make it worse: YES- light did make worse unsure about sound  Does sleep help: when finally fell asleep woke with a dull HA  Do you wake up with a headache or does it wake you from sleep: No                 Are you able to do daily activities: No  Accompanying Signs & Symptoms:   Stiff neck: YES- after - currently - possibly from massage  Fever: No  Confusion: YES- couldn't spell - could barely carry on a conversation  Sinus pressure: YES  Nausea or vomiting: YES- vomiting - Tues episode several times   Dizziness: YES  Numbness: No  Weakness: YES - during and after - could barely walk  Visual changes: YES- sensitive to light  History:    Any head trauma: No  Any previously history of headaches: No  Any family history of migraines: YES- sister mention a few years ago - mother has history  "of brain aneurysm surgery about 10 years ago   Any previous tests for headaches: no  Have you seen a neurologist before: No  Medications tried and outcome:  Excedrin Migraine with minor relief-didn't get worse after taking. Took 2 times last week    3/5 severe headache speech difficulty word finding difficult vomiting.  3/7 headache one sided LEFT with visual change.  NOW dull pressure all over.  Family history of stroke in grandfather.   Family history of brain aneurysm mother recent surgical intervention.   Sister migraines.  No history of migraines.   .    is worried something is wrong.       Constitutional, HEENT, cardiovascular, pulmonary, GI, , musculoskeletal, neuro, skin, endocrine and psych systems are negative, except as otherwise noted in the HPI.         Objective    /80 (BP Location: Right arm, Patient Position: Chair, Cuff Size: Adult Large)   Pulse 71   Temp 97.6  F (36.4  C) (Tympanic)   Resp 11   Ht 1.499 m (4' 11\")   Wt 98.4 kg (217 lb)   LMP  (LMP Unknown)   SpO2 99%   BMI 43.83 kg/m    Body mass index is 43.83 kg/m .  Physical Exam   GENERAL: alert and no distress  EYES: Eyes grossly normal to inspection  NEURO: Normal strength and tone, mentation intact and speech normal; CN 2-12 intact, great  strength equal, neg pronator drift  PSYCH: mentation appears normal, affect normal/bright        .   Signed Electronically by: RAFAEL Vance CNP    "

## 2024-03-15 ENCOUNTER — TELEPHONE (OUTPATIENT)
Dept: SCHEDULING | Facility: CLINIC | Age: 64
End: 2024-03-15

## 2024-03-15 ENCOUNTER — ANCILLARY PROCEDURE (OUTPATIENT)
Dept: GENERAL RADIOLOGY | Facility: CLINIC | Age: 64
End: 2024-03-15
Attending: NURSE PRACTITIONER
Payer: COMMERCIAL

## 2024-03-15 ENCOUNTER — OFFICE VISIT (OUTPATIENT)
Dept: FAMILY MEDICINE | Facility: CLINIC | Age: 64
End: 2024-03-15
Payer: COMMERCIAL

## 2024-03-15 VITALS
OXYGEN SATURATION: 90 % | HEART RATE: 82 BPM | SYSTOLIC BLOOD PRESSURE: 158 MMHG | WEIGHT: 219.8 LBS | BODY MASS INDEX: 44.31 KG/M2 | TEMPERATURE: 98 F | HEIGHT: 59 IN | RESPIRATION RATE: 18 BRPM | DIASTOLIC BLOOD PRESSURE: 98 MMHG

## 2024-03-15 DIAGNOSIS — R51.9 NONINTRACTABLE HEADACHE, UNSPECIFIED CHRONICITY PATTERN, UNSPECIFIED HEADACHE TYPE: Primary | ICD-10-CM

## 2024-03-15 DIAGNOSIS — I10 HTN, GOAL BELOW 130/80: ICD-10-CM

## 2024-03-15 DIAGNOSIS — E78.5 HYPERLIPIDEMIA LDL GOAL <100: ICD-10-CM

## 2024-03-15 DIAGNOSIS — R05.9 COUGH, UNSPECIFIED TYPE: ICD-10-CM

## 2024-03-15 DIAGNOSIS — E66.01 MORBID OBESITY (H): ICD-10-CM

## 2024-03-15 DIAGNOSIS — E11.65 TYPE 2 DIABETES MELLITUS WITH HYPERGLYCEMIA, WITHOUT LONG-TERM CURRENT USE OF INSULIN (H): ICD-10-CM

## 2024-03-15 DIAGNOSIS — I67.9 SMALL VESSEL DISEASE, CEREBROVASCULAR: ICD-10-CM

## 2024-03-15 DIAGNOSIS — E04.1 THYROID NODULE: ICD-10-CM

## 2024-03-15 PROCEDURE — 71046 X-RAY EXAM CHEST 2 VIEWS: CPT | Mod: TC | Performed by: RADIOLOGY

## 2024-03-15 PROCEDURE — 99214 OFFICE O/P EST MOD 30 MIN: CPT | Performed by: NURSE PRACTITIONER

## 2024-03-15 RX ORDER — AMLODIPINE BESYLATE 5 MG/1
5 TABLET ORAL DAILY
Qty: 90 TABLET | Refills: 1 | Status: SHIPPED | OUTPATIENT
Start: 2024-03-15

## 2024-03-15 RX ORDER — TIRZEPATIDE 5 MG/.5ML
5 INJECTION, SOLUTION SUBCUTANEOUS
Qty: 2 ML | Refills: 1 | Status: SHIPPED | OUTPATIENT
Start: 2024-03-15

## 2024-03-15 RX ORDER — ROSUVASTATIN CALCIUM 5 MG/1
5 TABLET, COATED ORAL DAILY
Qty: 90 TABLET | Refills: 3 | Status: SHIPPED | OUTPATIENT
Start: 2024-03-15

## 2024-03-15 ASSESSMENT — ANXIETY QUESTIONNAIRES
2. NOT BEING ABLE TO STOP OR CONTROL WORRYING: NOT AT ALL
GAD7 TOTAL SCORE: 3
6. BECOMING EASILY ANNOYED OR IRRITABLE: NOT AT ALL
GAD7 TOTAL SCORE: 3
5. BEING SO RESTLESS THAT IT IS HARD TO SIT STILL: NOT AT ALL
1. FEELING NERVOUS, ANXIOUS, OR ON EDGE: SEVERAL DAYS
4. TROUBLE RELAXING: NOT AT ALL
GAD7 TOTAL SCORE: 3
8. IF YOU CHECKED OFF ANY PROBLEMS, HOW DIFFICULT HAVE THESE MADE IT FOR YOU TO DO YOUR WORK, TAKE CARE OF THINGS AT HOME, OR GET ALONG WITH OTHER PEOPLE?: NOT DIFFICULT AT ALL
7. FEELING AFRAID AS IF SOMETHING AWFUL MIGHT HAPPEN: SEVERAL DAYS
IF YOU CHECKED OFF ANY PROBLEMS ON THIS QUESTIONNAIRE, HOW DIFFICULT HAVE THESE PROBLEMS MADE IT FOR YOU TO DO YOUR WORK, TAKE CARE OF THINGS AT HOME, OR GET ALONG WITH OTHER PEOPLE: NOT DIFFICULT AT ALL
7. FEELING AFRAID AS IF SOMETHING AWFUL MIGHT HAPPEN: SEVERAL DAYS
3. WORRYING TOO MUCH ABOUT DIFFERENT THINGS: SEVERAL DAYS

## 2024-03-15 ASSESSMENT — PATIENT HEALTH QUESTIONNAIRE - PHQ9
10. IF YOU CHECKED OFF ANY PROBLEMS, HOW DIFFICULT HAVE THESE PROBLEMS MADE IT FOR YOU TO DO YOUR WORK, TAKE CARE OF THINGS AT HOME, OR GET ALONG WITH OTHER PEOPLE: NOT DIFFICULT AT ALL
SUM OF ALL RESPONSES TO PHQ QUESTIONS 1-9: 2
SUM OF ALL RESPONSES TO PHQ QUESTIONS 1-9: 2

## 2024-03-15 NOTE — LETTER
March 19, 2024      Jeannie Barger  [address] [place] [address]        To Whom It May Concern:      Jeannie Barger was seen in our clinic for medical evaluation. She may return to work without restrictions.      Sincerely,      Aliyah Long, APRN, CNP

## 2024-03-15 NOTE — TELEPHONE ENCOUNTER
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 Patient    Where did/will the form come from? Patient or family brought in       When is form/letter needed by: asap    How would you like the form/letter returned:     Patient Notified form requests are processed in 5-7 business days:No    Okay to leave a detailed message?: Yes at Cell number on file:    Telephone Information:   Mobile [phone]

## 2024-03-15 NOTE — PROGRESS NOTES
Assessment & Plan   Jeannie was seen today for follow up.    Diagnoses and all orders for this visit:    Nonintractable headache  Small vessel disease, cerebrovascular  New onset headaches and chronic microvascular disease, plan further consultation with neurology.    -     Adult Neurology  Referral; Future    Type 2 diabetes mellitus with hyperglycemia, without long-term current use of insulin (H)  Hemoglobin A1C   Date Value Ref Range Status   01/26/2024 6.7 (H) 0.0 - 5.6 % Final     Comment:     Normal <5.7%   Prediabetes 5.7-6.4%    Diabetes 6.5% or higher     Note: Adopted from ADA consensus guidelines.   12/12/2023 6.5 (H) 0.0 - 5.6 % Final     Comment:     Normal <5.7%   Prediabetes 5.7-6.4%    Diabetes 6.5% or higher     Note: Adopted from ADA consensus guidelines.   Stable Diabetes.  Currently doing well on weekly Mounjaro injections.    Continued with dietary and lifestyle modifications to support lowering of glucose levels.    -     Albumin Random Urine Quantitative with Creat Ratio; Future    Thyroid nodule  Plan further evaluation of thyroid ultrasound.    -     US Thyroid; Future    HTN, goal below 130/80  Currently uncontrolled on Diltiazem  mg daily and Atenolol-Chlorthalidone 100-25 mg daily, will plan initiation of Amlodipine 5 mg daily.    -     amLODIPine (NORVASC) 5 MG tablet; Take 1 tablet (5 mg) by mouth daily    Cough, unspecified type  -     XR Chest 2 Views; Future - no acute pulmonary disease.    Noted history of mild emphysema on CT of chest and using Albuterol inhaler as needed.   Consider further evaluation with PFT's.     Hyperlipidemia LDL goal <100  Recent Labs   Lab Test 12/12/23  1049 03/17/22  1043   CHOL 189 210*   HDL 49* 41*   * 141*   TRIG 100 141   The 10-year ASCVD risk score (Neeta IZAGUIRRE, et al., 2019) is: 17.3%    Values used to calculate the score:      Age: 64 years      Sex: Female      Is Non- : No      Diabetic: Yes       "Tobacco smoker: No      Systolic Blood Pressure: 158 mmHg      Is BP treated: Yes      HDL Cholesterol: 49 mg/dL      Total Cholesterol: 189 mg/dL    -     rosuvastatin (CRESTOR) 5 MG tablet; Take 1 tablet (5 mg) by mouth daily    Morbid obesity (H)  Continue with GLP-1 agonist and dietary/lifestyle modifications to support weight loss.    -     tirzepatide (MOUNJARO) 5 MG/0.5ML pen; Inject 5 mg Subcutaneous every 7 days        BMI  Estimated body mass index is 44.39 kg/m  as calculated from the following:    Height as of this encounter: 1.499 m (4' 11\").    Weight as of this encounter: 99.7 kg (219 lb 12.8 oz).     Return in about 3 months (around 6/15/2024) for Med check, In Clinic.        Ronda Dennis is a 64 year old, presenting for the following health issues:  Follow Up ([facility])        3/15/2024     9:47 AM   Additional Questions   Roomed by Valarie ISAAC       [facility] Followup:    Facility:  [facility]  Date of visit: 3-13-24  Reason for visit: Headache   Current Status: Head still feels full behind nose and eyes, a dull pressure but headache is gone       Is feeling better overall - less pressure in head.      +Cough, no shortness of breath and wheezing.   No fevers.    New onset headaches and chronic microvascular disease  Neurology referral needed    Has been on Mounjaro since January 7th and then increased in February.      Recent Labs   Lab Test 12/12/23  1049 03/17/22  1043   CHOL 189 210*   HDL 49* 41*   * 141*   TRIG 100 141   The 10-year ASCVD risk score (Neeta IZAGUIRRE, et al., 2019) is: 17.3%    Values used to calculate the score:      Age: 64 years      Sex: Female      Is Non- : No      Diabetic: Yes      Tobacco smoker: No      Systolic Blood Pressure: 158 mmHg      Is BP treated: Yes      HDL Cholesterol: 49 mg/dL      Total Cholesterol: 189 mg/dL    Thyroid nodule -  Noted thyroid nodule on MRA of neck.  Plan further evaluation with thyroid ultrasound.  " "    Mild emphysema  Noted on CT of chest on 7/11/23.        Review of Systems  Constitutional, HEENT, cardiovascular, pulmonary, gi and gu systems are negative, except as otherwise noted.      Objective    BP (!) 158/98   Pulse 82   Temp 98  F (36.7  C) (Tympanic)   Resp 18   Ht 1.499 m (4' 11\")   Wt 99.7 kg (219 lb 12.8 oz)   LMP  (LMP Unknown)   SpO2 90%   BMI 44.39 kg/m    Body mass index is 44.39 kg/m .  88-90% oxygen saturation.      Physical Exam     GENERAL: alert and no distress  EYES: Eyes grossly normal to inspection, PERRL and conjunctivae and sclerae normal  RESP: lungs clear to auscultation - no rales, rhonchi or wheezes  CV: regular rate and rhythm, normal S1 S2, no S3 or S4, no murmur, click or rub, no peripheral edema  PSYCH: mentation appears normal, affect normal/bright      Signed Electronically by: Aliyah Long, RAFAEL CNP    "

## 2024-03-28 ENCOUNTER — PATIENT OUTREACH (OUTPATIENT)
Dept: FAMILY MEDICINE | Facility: CLINIC | Age: 64
End: 2024-03-28
Payer: COMMERCIAL

## 2024-04-09 ENCOUNTER — OFFICE VISIT (OUTPATIENT)
Dept: FAMILY MEDICINE | Facility: CLINIC | Age: 64
End: 2024-04-09
Payer: COMMERCIAL

## 2024-04-09 VITALS
HEART RATE: 68 BPM | HEIGHT: 59 IN | BODY MASS INDEX: 43.14 KG/M2 | SYSTOLIC BLOOD PRESSURE: 138 MMHG | WEIGHT: 214 LBS | DIASTOLIC BLOOD PRESSURE: 78 MMHG | TEMPERATURE: 98.1 F | RESPIRATION RATE: 16 BRPM | OXYGEN SATURATION: 93 %

## 2024-04-09 DIAGNOSIS — R51.9 NONINTRACTABLE HEADACHE, UNSPECIFIED CHRONICITY PATTERN, UNSPECIFIED HEADACHE TYPE: ICD-10-CM

## 2024-04-09 DIAGNOSIS — E11.65 TYPE 2 DIABETES MELLITUS WITH HYPERGLYCEMIA, WITHOUT LONG-TERM CURRENT USE OF INSULIN (H): Primary | ICD-10-CM

## 2024-04-09 DIAGNOSIS — E78.5 HYPERLIPIDEMIA LDL GOAL <100: ICD-10-CM

## 2024-04-09 DIAGNOSIS — E66.01 MORBID OBESITY (H): ICD-10-CM

## 2024-04-09 PROCEDURE — 99214 OFFICE O/P EST MOD 30 MIN: CPT | Performed by: NURSE PRACTITIONER

## 2024-04-09 RX ORDER — TIRZEPATIDE 7.5 MG/.5ML
7.5 INJECTION, SOLUTION SUBCUTANEOUS
Qty: 2 ML | Refills: 3 | Status: SHIPPED | OUTPATIENT
Start: 2024-04-09 | End: 2024-10-07

## 2024-04-09 RX ORDER — RIZATRIPTAN BENZOATE 5 MG/1
5 TABLET ORAL
Qty: 20 TABLET | Refills: 2 | Status: SHIPPED | OUTPATIENT
Start: 2024-04-09

## 2024-04-09 NOTE — PROGRESS NOTES
"  Assessment & Plan     Nonintractable headache, unspecified chronicity pattern, unspecified headache type  Improved, refilled, patient to schedule appt with neurology.   - rizatriptan (MAXALT) 5 MG tablet  Dispense: 20 tablet; Refill: 2    Type 2 diabetes mellitus with hyperglycemia, without long-term current use of insulin (H)  Morbid obesity (H)  Stable, due for A1C recheck.  Patient will return for lab only appointment to have checked with cholesterol. Steady weight loss (down 4lbs since 3/15/24), no concerns or SE on current 5mg dose. Will finish out current package (3 left) then move to 7.5mg dosing. Goal weight is 150.   - tirzepatide (MOUNJARO) 7.5 MG/0.5ML pen  Dispense: 2 mL; Refill: 3  - Hemoglobin A1c    Hyperlipidemia LDL goal <100  Due for lipid panel, will return at a later date for a fasting appt.   Currently on Rosuvastatin 5 mg daily.    - Lipid panel reflex to direct LDL Fasting        BMI  Estimated body mass index is 43.22 kg/m  as calculated from the following:    Height as of this encounter: 1.499 m (4' 11\").    Weight as of this encounter: 97.1 kg (214 lb).     Return in about 6 months (around 10/9/2024) for Med check, In Clinic.      Ronda Dennis is a 64 year old, presenting for the following health issues:  Recheck Medication        4/9/2024     9:44 AM   Additional Questions   Roomed by Dominique TANG CMA     History of Present Illness       Hypertension: She presents for follow up of hypertension.  She does not check blood pressure  regularly outside of the clinic. Outside blood pressures have been over 140/90. She does not follow a low salt diet.     She eats 2-3 servings of fruits and vegetables daily.She consumes 0 sweetened beverage(s) daily.She exercises with enough effort to increase her heart rate 9 or less minutes per day.  She exercises with enough effort to increase her heart rate 3 or less days per week. She is missing 1 dose(s) of medications per week.     Patient is following " up on the dose increase started on 1- from Monjauro 2.5 mg to 5 mg once weekly-tolerating increase well. No negative side effects.     Hyperlipidemia Follow-Up  Recent Labs   Lab Test 12/12/23  1049 03/17/22  1043   CHOL 189 210*   HDL 49* 41*   * 141*   TRIG 100 141       Are you regularly taking any medication or supplement to lower your cholesterol?   Yes- rosuvastatin  Are you having muscle aches or other side effects that you think could be caused by your cholesterol lowering medication?  No    Anxiety   How are you doing with your anxiety since your last visit? Improved   Are you having other symptoms that might be associated with anxiety? No  Have you had a significant life event? No   Are you feeling depressed? No  Do you have any concerns with your use of alcohol or other drugs? Marly Dennis is a 64 year old female with a PMH of HTN, obesity, hyperlipidemia, and melanoma who presents to follow up on their mounjaro. They recently increased to the 5mg dose, completed 4 weeks, they tolerated the increase with no side effects. Notes weight loss has hit more of a plateau, goal weight 150. Of note they were recently on vacation, active and watched intake, no weight gain.     BP: not monitoring at home currently they do have a monitor but feel it's unreliable.     HA: Used triptan x4 to abort headache, worked well, would like a refill to have on hand. Patient acknowledges they still nee to schedule neurology appt as well as thyroid US.       Social History     Tobacco Use    Smoking status: Former     Packs/day: 0.75     Years: 20.00     Additional pack years: 0.00     Total pack years: 15.00     Types: Cigarettes    Smokeless tobacco: Never   Vaping Use    Vaping Use: Never used   Substance Use Topics    Alcohol use: No    Drug use: No         12/12/2023     9:47 AM 1/26/2024     8:47 AM 3/15/2024     9:43 AM   PATRICIA-7 SCORE   Total Score 10 (moderate anxiety) 2 (minimal anxiety) 3 (minimal  "anxiety)   Total Score 10 2 3         12/12/2023     9:40 AM 1/26/2024     8:46 AM 3/15/2024     9:42 AM   PHQ   PHQ-9 Total Score 11 5 2   Q9: Thoughts of better off dead/self-harm past 2 weeks Not at all Not at all Not at all     Review of Systems  Constitutional, HEENT, cardiovascular, pulmonary, gi and gu systems are negative, except as otherwise noted.      Objective    /78   Pulse 68   Temp 98.1  F (36.7  C) (Tympanic)   Resp 16   Ht 1.499 m (4' 11\")   Wt 97.1 kg (214 lb)   LMP  (LMP Unknown)   SpO2 93%   BMI 43.22 kg/m    Body mass index is 43.22 kg/m .    Physical Exam     GENERAL: alert and no distress  RESP: lungs clear to auscultation - no rales, rhonchi or wheezes  CV: regular rate and rhythm, normal S1 S2, no S3 or S4, no murmur, click or rub, no peripheral edema  SKIN: no suspicious lesions or rashes on exposed skin  PSYCH: mentation appears normal, affect normal/bright        Maisha Zendejas DNP Student at the [facility]    I was present with the student who participated in the service and in the documentation of the note, which I have reviewed and verified. The history, reviews of systems, objective data, and assessment/plan were completed by myself.    Signed Electronically by: RAFAEL Cobb CNP    "

## 2024-04-24 ENCOUNTER — TELEPHONE (OUTPATIENT)
Dept: FAMILY MEDICINE | Facility: CLINIC | Age: 64
End: 2024-04-24
Payer: COMMERCIAL

## 2024-04-24 DIAGNOSIS — E11.65 TYPE 2 DIABETES MELLITUS WITH HYPERGLYCEMIA, WITHOUT LONG-TERM CURRENT USE OF INSULIN (H): ICD-10-CM

## 2024-04-24 DIAGNOSIS — E66.01 MORBID OBESITY (H): Primary | ICD-10-CM

## 2024-04-24 NOTE — TELEPHONE ENCOUNTER
Patient is requesting a prescription for Mounjaro 2.5mg, that is the only dose that is available at [facility]    She wants the 2.5mg ordered until the  7.5 mg dose is available or should she go off cold turkey?     Patient also asks can she take Metformin 500 mg once daily  in addition to the 2.5 mg dose until the 7.5mg comes in ?   She has 2 month supply on Metformin.     Next dose due Sunday.    LOV 4/9/24    [phone]- can leave a detailed voicemail.

## 2024-04-25 NOTE — TELEPHONE ENCOUNTER
Attempt # 1    Called # [phone]     Left a non detailed VM to call back at [phone] and ask for any available Triage Nurse.    Vinh Ricardo RN  [facility]

## 2024-04-25 NOTE — TELEPHONE ENCOUNTER
Recommend maintaining on Mounjaro 2.5 mg once weekly.  Prescription sent to pharmacy.   Ok to resume Metformin 500 mg once daily.     - Peter, CNP

## 2024-06-15 DIAGNOSIS — I10 HTN, GOAL BELOW 130/80: ICD-10-CM

## 2024-06-17 RX ORDER — AMLODIPINE BESYLATE 5 MG/1
5 TABLET ORAL DAILY
Qty: 90 TABLET | Refills: 1 | OUTPATIENT
Start: 2024-06-17

## 2024-06-21 ENCOUNTER — TELEPHONE (OUTPATIENT)
Dept: SLEEP MEDICINE | Facility: CLINIC | Age: 64
End: 2024-06-21

## 2024-06-21 NOTE — TELEPHONE ENCOUNTER
Hi scheduling team,    VF team was unable to reach patient x3 for virtual visit today with Balta Sheth, APRN CNP . Please follow up and reschedule appointment at patients convenience.     Thank you,  Kristina Patel, Virtual Facilitator

## 2024-09-01 ENCOUNTER — TRANSFERRED RECORDS (OUTPATIENT)
Dept: MULTI SPECIALTY CLINIC | Facility: CLINIC | Age: 64
End: 2024-09-01

## 2024-09-01 LAB — RETINOPATHY: NORMAL

## 2024-10-06 DIAGNOSIS — E11.65 TYPE 2 DIABETES MELLITUS WITH HYPERGLYCEMIA, WITHOUT LONG-TERM CURRENT USE OF INSULIN (H): ICD-10-CM

## 2024-10-06 DIAGNOSIS — E66.01 MORBID OBESITY (H): ICD-10-CM

## 2024-10-08 NOTE — TELEPHONE ENCOUNTER
Patient due for lab only visit and subsequent Diabetes follow-up visit for med check.  - KAROLINEeicinda, CNP

## 2024-10-22 ENCOUNTER — OFFICE VISIT (OUTPATIENT)
Dept: FAMILY MEDICINE | Facility: CLINIC | Age: 64
End: 2024-10-22
Payer: COMMERCIAL

## 2024-10-22 VITALS
HEART RATE: 51 BPM | DIASTOLIC BLOOD PRESSURE: 84 MMHG | WEIGHT: 197 LBS | OXYGEN SATURATION: 98 % | HEIGHT: 60 IN | RESPIRATION RATE: 18 BRPM | TEMPERATURE: 98.2 F | BODY MASS INDEX: 38.68 KG/M2 | SYSTOLIC BLOOD PRESSURE: 128 MMHG

## 2024-10-22 DIAGNOSIS — E66.01 MORBID OBESITY (H): ICD-10-CM

## 2024-10-22 DIAGNOSIS — G47.00 INSOMNIA, UNSPECIFIED TYPE: ICD-10-CM

## 2024-10-22 DIAGNOSIS — E78.5 HYPERLIPIDEMIA LDL GOAL <100: ICD-10-CM

## 2024-10-22 DIAGNOSIS — I10 HTN, GOAL BELOW 130/80: ICD-10-CM

## 2024-10-22 DIAGNOSIS — Z23 NEED FOR PNEUMOCOCCAL VACCINATION: ICD-10-CM

## 2024-10-22 DIAGNOSIS — Z23 NEED FOR COVID-19 VACCINE: ICD-10-CM

## 2024-10-22 DIAGNOSIS — E11.65 TYPE 2 DIABETES MELLITUS WITH HYPERGLYCEMIA, WITHOUT LONG-TERM CURRENT USE OF INSULIN (H): Primary | ICD-10-CM

## 2024-10-22 DIAGNOSIS — Z23 NEED FOR INFLUENZA VACCINATION: ICD-10-CM

## 2024-10-22 LAB
CHOLEST SERPL-MCNC: 148 MG/DL
CREAT UR-MCNC: 86.8 MG/DL
EST. AVERAGE GLUCOSE BLD GHB EST-MCNC: 117 MG/DL
FASTING STATUS PATIENT QL REPORTED: YES
HBA1C MFR BLD: 5.7 % (ref 0–5.6)
HDLC SERPL-MCNC: 41 MG/DL
LDLC SERPL CALC-MCNC: 85 MG/DL
MICROALBUMIN UR-MCNC: 127 MG/L
MICROALBUMIN/CREAT UR: 146.31 MG/G CR (ref 0–25)
NONHDLC SERPL-MCNC: 107 MG/DL
TRIGL SERPL-MCNC: 110 MG/DL

## 2024-10-22 PROCEDURE — 90472 IMMUNIZATION ADMIN EACH ADD: CPT | Performed by: NURSE PRACTITIONER

## 2024-10-22 PROCEDURE — 90471 IMMUNIZATION ADMIN: CPT | Performed by: NURSE PRACTITIONER

## 2024-10-22 PROCEDURE — 36415 COLL VENOUS BLD VENIPUNCTURE: CPT | Performed by: NURSE PRACTITIONER

## 2024-10-22 PROCEDURE — 83036 HEMOGLOBIN GLYCOSYLATED A1C: CPT | Performed by: NURSE PRACTITIONER

## 2024-10-22 PROCEDURE — 90673 RIV3 VACCINE NO PRESERV IM: CPT | Performed by: NURSE PRACTITIONER

## 2024-10-22 PROCEDURE — 91320 SARSCV2 VAC 30MCG TRS-SUC IM: CPT | Performed by: NURSE PRACTITIONER

## 2024-10-22 PROCEDURE — 80061 LIPID PANEL: CPT | Performed by: NURSE PRACTITIONER

## 2024-10-22 PROCEDURE — 82570 ASSAY OF URINE CREATININE: CPT | Performed by: NURSE PRACTITIONER

## 2024-10-22 PROCEDURE — 99214 OFFICE O/P EST MOD 30 MIN: CPT | Mod: 25 | Performed by: NURSE PRACTITIONER

## 2024-10-22 PROCEDURE — 90480 ADMN SARSCOV2 VAC 1/ONLY CMP: CPT | Performed by: NURSE PRACTITIONER

## 2024-10-22 PROCEDURE — 82043 UR ALBUMIN QUANTITATIVE: CPT | Performed by: NURSE PRACTITIONER

## 2024-10-22 PROCEDURE — 90677 PCV20 VACCINE IM: CPT | Performed by: NURSE PRACTITIONER

## 2024-10-22 RX ORDER — HYDROXYZINE HYDROCHLORIDE 10 MG/1
10-20 TABLET, FILM COATED ORAL
Qty: 60 TABLET | Refills: 1 | Status: SHIPPED | OUTPATIENT
Start: 2024-10-22

## 2024-10-22 NOTE — PROGRESS NOTES
"  Assessment & Plan     Jeannie was seen today for recheck medication.    Diagnoses and all orders for this visit:    Type 2 diabetes mellitus with hyperglycemia, without long-term current use of insulin (H)  Morbid obesity (H)  Hemoglobin A1C   Date Value Ref Range Status   10/22/2024 5.7 (H) 0.0 - 5.6 % Final     Comment:     Normal <5.7%   Prediabetes 5.7-6.4%    Diabetes 6.5% or higher     Note: Adopted from ADA consensus guidelines.   01/26/2024 6.7 (H) 0.0 - 5.6 % Final     Comment:     Normal <5.7%   Prediabetes 5.7-6.4%    Diabetes 6.5% or higher     Note: Adopted from ADA consensus guidelines.   12/12/2023 6.5 (H) 0.0 - 5.6 % Final     Comment:     Normal <5.7%   Prediabetes 5.7-6.4%    Diabetes 6.5% or higher     Note: Adopted from ADA consensus guidelines.     Stable, well controlled Diabetes and 50 lb weight loss with Tirzepatide.    -     Tirzepatide 10 MG/0.5ML SOAJ; Inject 0.5 mLs (10 mg) subcutaneously every 7 days.  -     Hemoglobin A1c  -     Albumin Random Urine Quantitative with Creat Ratio    Hyperlipidemia LDL goal <100  -     Lipid panel reflex to direct LDL Fasting    Insomnia, unspecified type  Plan trial of Hydroxyzine 10-20 mg at bedtime as needed.    Reviewed sleep hygiene, limiting caffeine.    -     hydrOXYzine HCl (ATARAX) 10 MG tablet; Take 1-2 tablets (10-20 mg) by mouth nightly as needed (insomnia).    HTN, goal below 130/80  Stable, well controlled blood pressure on current antihypertensive regimen.    -     Albumin Random Urine Quantitative with Creat Ratio    Need for pneumococcal vaccination  -     Pneumococcal 20 Valent Conjugate (Prevnar 20)    Need for influenza vaccination  -     INFLUENZA VACCINE TRIVALENT(FLUBLOK)    Need for COVID-19 vaccine  -     COVID-19 12+ (PFIZER)      BMI  Estimated body mass index is 39.12 kg/m  as calculated from the following:    Height as of this encounter: 1.511 m (4' 11.5\").    Weight as of this encounter: 89.4 kg (197 lb).     Return in " about 2 months (around 12/22/2024) for Preventative Visit + Med Check.      The longitudinal plan of care for the diagnosis(es)/condition(s) as documented were addressed during this visit. Due to the added complexity in care, I will continue to support Jeannie in the subsequent management and with ongoing continuity of care.    Ronda Dennis is a 64 year old, presenting for the following health issues:  Recheck Medication        10/22/2024    10:53 AM   Additional Questions   Roomed by Diana ARANA     History of Present Illness       Diabetes:   She presents for follow up of diabetes.  She is checking home blood glucose a few times a month.   She checks blood glucose at bedtime.  Blood glucose is never over 200 and never under 70. She is aware of hypoglycemia symptoms including weakness.    She has no concerns regarding her diabetes at this time.   She is not experiencing numbness or burning in feet, excessive thirst, blurry vision, weight changes or redness, sores or blisters on feet.           Hypertension: She presents for follow up of hypertension.  She does not check blood pressure  regularly outside of the clinic. Outside blood pressures have been over 140/90. She does not follow a low salt diet.     She eats 2-3 servings of fruits and vegetables daily.She consumes 0 sweetened beverage(s) daily.She exercises with enough effort to increase her heart rate 9 or less minutes per day.  She exercises with enough effort to increase her heart rate 3 or less days per week. She is missing 1 dose(s) of medications per week.     Hemoglobin A1C   Date Value Ref Range Status   10/22/2024 5.7 (H) 0.0 - 5.6 % Final     Comment:     Normal <5.7%   Prediabetes 5.7-6.4%    Diabetes 6.5% or higher     Note: Adopted from ADA consensus guidelines.   01/26/2024 6.7 (H) 0.0 - 5.6 % Final     Comment:     Normal <5.7%   Prediabetes 5.7-6.4%    Diabetes 6.5% or higher     Note: Adopted from ADA consensus guidelines.   12/12/2023 6.5  "(H) 0.0 - 5.6 % Final     Comment:     Normal <5.7%   Prediabetes 5.7-6.4%    Diabetes 6.5% or higher     Note: Adopted from ADA consensus guidelines.     Has lost 50 lbs with Mounjaro.  It is helping with hunger and cravings.  Is eating healthier.  Would like to increase this dose due to feeling a plateau in weight.      Insomnia over the past 5 years.  Melatonin 20 mg at bedtime (3 times per week) - this helps her fall asleep but doesn't stay asleep.  Waking up every 2 hours and then uses the bathroom.  Is fatigued    Is taking Atenolol-Chorthalidone in the evening.      Works as a .  Wakes up at 4:30 a.m.   Stops caffeine before 6 p.m.          Review of Systems  Constitutional, HEENT, cardiovascular, pulmonary, gi and gu systems are negative, except as otherwise noted.      Objective    /84   Pulse 51   Temp 98.2  F (36.8  C)   Resp 18   Ht 1.511 m (4' 11.5\")   Wt 89.4 kg (197 lb)   LMP  (LMP Unknown)   SpO2 98%   BMI 39.12 kg/m    Body mass index is 39.12 kg/m .    Physical Exam     GENERAL: alert and no distress  RESP: lungs clear to auscultation - no rales, rhonchi or wheezes  CV: regular rate and rhythm, normal S1 S2, no S3 or S4, no murmur, click or rub, no peripheral edema  PSYCH: mentation appears normal, affect normal/bright        Signed Electronically by: RAFAEL Cobb CNP    "

## 2024-10-22 NOTE — LETTER
[facility]  [address] [place] [address]  [phone]                    October 25, 2024    Jeannie Barger  [address] [place] [address]      Dear Jeannie,    Here is a summary of your recent test results:    -Cholesterol levels are at your goal levels.  ADVISE: continuing your medication, a regular exercise program with at least 150 minutes of aerobic exercise per week, and eating a low saturated fat/low carbohydrate diet.  Also, you should recheck this fasting cholesterol panel in 12 months.   -A1C (test of diabetes control the last 2-3 months) is at your goal. Please continue with your current plan.   -Microalbumin (urine protein) level is elevated. This is suggestive of early damage to your kidneys from high blood pressure and diabetes.  ADVISE: avoiding anti-inflamatory agents such as ibuprofen (Advil, Motrin) or naproxen (Aleve) as much as possible, keeping your blood pressure in a normal range, and rechecking your microalbumin level in 3 months.     If you have further questions about the interpretation of your labs, labtestsonline.org is a good website to check out for further information.     Your test results are enclosed.      Please contact me if you have any questions.    In addition, here is a list of due or overdue Health Maintenance reminders.    Health Maintenance Due   Topic Date Due    Diabetic Foot Exam  Never done    Discuss Advance Care Planning  Never done    Colorectal Cancer Screening  Never done    Zoster (Shingles) Vaccine (1 of 2) Never done    LUNG CANCER SCREENING  07/11/2024       Please call us at [phone] (or use [employer]) to address the above recommendations.            Thank you very much for trusting [facility].     Healthy regards,      Aliyah Long, APRN, CNP / ch          Results for orders placed or performed in visit on 10/22/24   Lipid panel reflex to direct LDL Fasting     Status: Abnormal    Result Value Ref Range    Cholesterol 148 <200 mg/dL    Triglycerides 110 <150 mg/dL    Direct Measure HDL 41 (L) >=50 mg/dL    LDL Cholesterol Calculated 85 <100 mg/dL    Non HDL Cholesterol 107 <130 mg/dL    Patient Fasting > 8hrs? Yes     Narrative    Cholesterol  Desirable: < 200 mg/dL  Borderline High: 200 - 239 mg/dL  High: >= 240 mg/dL    Triglycerides  Normal: < 150 mg/dL  Borderline High: 150 - 199 mg/dL  High: 200-499 mg/dL  Very High: >= 500 mg/dL    Direct Measure HDL  Female: >= 50 mg/dL   Male: >= 40 mg/dL    LDL Cholesterol  Desirable: < 100 mg/dL  Above Desirable: 100 - 129 mg/dL   Borderline High: 130 - 159 mg/dL   High:  160 - 189 mg/dL   Very High: >= 190 mg/dL    Non HDL Cholesterol  Desirable: < 130 mg/dL  Above Desirable: 130 - 159 mg/dL  Borderline High: 160 - 189 mg/dL  High: 190 - 219 mg/dL  Very High: >= 220 mg/dL   Hemoglobin A1c     Status: Abnormal   Result Value Ref Range    Estimated Average Glucose 117 (H) <117 mg/dL    Hemoglobin A1C 5.7 (H) 0.0 - 5.6 %   Albumin Random Urine Quantitative with Creat Ratio     Status: Abnormal   Result Value Ref Range    Creatinine Urine mg/dL 86.8 mg/dL    Albumin Urine mg/L 127.0 mg/L    Albumin Urine mg/g Cr 146.31 (H) 0.00 - 25.00 mg/g Cr

## 2024-12-15 DIAGNOSIS — I10 BENIGN ESSENTIAL HYPERTENSION: ICD-10-CM

## 2024-12-16 RX ORDER — ATENOLOL AND CHLORTHALIDONE TABLET 100; 25 MG/1; MG/1
1 TABLET ORAL DAILY
Qty: 90 TABLET | Refills: 2 | Status: SHIPPED | OUTPATIENT
Start: 2024-12-16

## 2024-12-16 RX ORDER — DILTIAZEM HYDROCHLORIDE 180 MG/1
180 CAPSULE, EXTENDED RELEASE ORAL DAILY
Qty: 90 CAPSULE | Refills: 2 | Status: SHIPPED | OUTPATIENT
Start: 2024-12-16

## 2024-12-18 DIAGNOSIS — E78.5 HYPERLIPIDEMIA LDL GOAL <100: ICD-10-CM

## 2024-12-18 RX ORDER — ROSUVASTATIN CALCIUM 5 MG/1
5 TABLET, COATED ORAL DAILY
Qty: 90 TABLET | Refills: 3 | OUTPATIENT
Start: 2024-12-18

## 2024-12-28 DIAGNOSIS — G47.00 INSOMNIA, UNSPECIFIED TYPE: ICD-10-CM

## 2024-12-30 RX ORDER — HYDROXYZINE HYDROCHLORIDE 10 MG/1
10-20 TABLET, FILM COATED ORAL
Qty: 180 TABLET | Refills: 1 | Status: SHIPPED | OUTPATIENT
Start: 2024-12-30

## 2025-01-03 ENCOUNTER — TELEPHONE (OUTPATIENT)
Dept: FAMILY MEDICINE | Facility: CLINIC | Age: 65
End: 2025-01-03
Payer: COMMERCIAL

## 2025-01-03 NOTE — TELEPHONE ENCOUNTER
Prior Authorization Retail Medication Request    Medication/Dose: MOUNJARO 10MG/0.5ML AUTO INJ  Diagnosis and ICD code (if different than what is on RX):    New/renewal/insurance change PA/secondary ins. PA:  Previously Tried and Failed:    Rationale:      Insurance   Primary: [employer]     Insurance ID:  [id number]     Secondary (if applicable):  Insurance ID:      Pharmacy Information (if different than what is on RX)  Name:  [facility]  Phone:  [phone]  Fax: [phone]    Clinic Information  Preferred routing pool for dept communication:

## 2025-01-05 NOTE — TELEPHONE ENCOUNTER
PA Initiation    Medication: MOUNJARO 10 MG/0.5ML SC SOAJ  Insurance Company: Other (see comments)  Pharm[facility] the Rx[id number] [place] [address]  Filling Pharmacy Phone: [phone]  Filling Pharmacy Fax: [phone]  Start Date: 1/5/2025

## 2025-01-06 NOTE — TELEPHONE ENCOUNTER
Prior Authorization Approval    Medication: MOUNJARO 10 MG/0.5ML SC SOAJ  Authorization Effective Date: 1/6/2025  Authorization Expiration Date: 1/6/2026  Approved Dose/Quantity:   Reference #:     Insurance Company: Other (see comments)  Expected CoPay: $    CoPay Card Available:      Financial Assistance Needed:   Which Pharmacy is filling the prescription: [id number] - [place], [place] - [address] AT [place] [address] & [address]  Pharmacy Notified: YES  Patient Notified: **Instructed pharmacy to notify patient when script is ready to /ship.**

## 2025-02-04 ENCOUNTER — PATIENT OUTREACH (OUTPATIENT)
Dept: FAMILY MEDICINE | Facility: CLINIC | Age: 65
End: 2025-02-04
Payer: COMMERCIAL

## 2025-02-04 DIAGNOSIS — R87.810 ASCUS WITH POSITIVE HIGH RISK HPV CERVICAL: Primary | ICD-10-CM

## 2025-02-04 DIAGNOSIS — R87.610 ASCUS WITH POSITIVE HIGH RISK HPV CERVICAL: Primary | ICD-10-CM

## 2025-02-04 NOTE — LETTER
February 4, 2025      Jeannie Barger  [address] [place] [address]        Dear ,    This letter is to remind you that you are due for your follow-up Pap smear and Human Papillomavirus (HPV) test.    Please call [phone] to schedule your appointment at your earliest convenience.    If you have completed the appointment outside of the [facility] system, please have the records forwarded to our office. We will update your chart for your provider to review before your next annual wellness visit.     Thank you for choosing [facility]!      Sincerely,    Your [facility] Care Team  
01-Mar-2018

## 2025-03-10 DIAGNOSIS — E11.65 TYPE 2 DIABETES MELLITUS WITH HYPERGLYCEMIA, WITHOUT LONG-TERM CURRENT USE OF INSULIN (H): ICD-10-CM

## 2025-03-10 DIAGNOSIS — E66.01 MORBID OBESITY (H): ICD-10-CM

## 2025-03-11 RX ORDER — TIRZEPATIDE 10 MG/.5ML
INJECTION, SOLUTION SUBCUTANEOUS
Qty: 2 ML | Refills: 3 | Status: SHIPPED | OUTPATIENT
Start: 2025-03-11

## 2025-04-01 ENCOUNTER — HOSPITAL ENCOUNTER (OUTPATIENT)
Dept: MAMMOGRAPHY | Facility: CLINIC | Age: 65
Discharge: HOME | End: 2025-04-01
Attending: NURSE PRACTITIONER | Admitting: NURSE PRACTITIONER
Payer: COMMERCIAL

## 2025-04-01 DIAGNOSIS — Z12.31 VISIT FOR SCREENING MAMMOGRAM: ICD-10-CM

## 2025-04-01 PROCEDURE — 77063 BREAST TOMOSYNTHESIS BI: CPT

## 2025-04-01 PROCEDURE — 77067 SCR MAMMO BI INCL CAD: CPT

## 2025-04-18 ENCOUNTER — OFFICE VISIT (OUTPATIENT)
Dept: FAMILY MEDICINE | Facility: CLINIC | Age: 65
End: 2025-04-18
Payer: COMMERCIAL

## 2025-04-18 VITALS
HEART RATE: 71 BPM | HEIGHT: 60 IN | SYSTOLIC BLOOD PRESSURE: 132 MMHG | DIASTOLIC BLOOD PRESSURE: 90 MMHG | BODY MASS INDEX: 38.18 KG/M2 | RESPIRATION RATE: 20 BRPM | OXYGEN SATURATION: 94 % | WEIGHT: 194.5 LBS | TEMPERATURE: 96.9 F

## 2025-04-18 DIAGNOSIS — E66.01 MORBID OBESITY (H): Primary | ICD-10-CM

## 2025-04-18 DIAGNOSIS — D03.30 MELANOMA IN SITU OF FACE (H): ICD-10-CM

## 2025-04-18 DIAGNOSIS — E11.65 TYPE 2 DIABETES MELLITUS WITH HYPERGLYCEMIA, WITHOUT LONG-TERM CURRENT USE OF INSULIN (H): ICD-10-CM

## 2025-04-18 DIAGNOSIS — E04.1 THYROID NODULE: ICD-10-CM

## 2025-04-18 DIAGNOSIS — M41.9 SCOLIOSIS OF CERVICOTHORACIC SPINE, UNSPECIFIED SCOLIOSIS TYPE: ICD-10-CM

## 2025-04-18 DIAGNOSIS — I10 BENIGN ESSENTIAL HYPERTENSION: ICD-10-CM

## 2025-04-18 DIAGNOSIS — R51.9 NONINTRACTABLE HEADACHE, UNSPECIFIED CHRONICITY PATTERN, UNSPECIFIED HEADACHE TYPE: ICD-10-CM

## 2025-04-18 DIAGNOSIS — Z12.11 SCREEN FOR COLON CANCER: ICD-10-CM

## 2025-04-18 DIAGNOSIS — I10 HTN, GOAL BELOW 130/80: ICD-10-CM

## 2025-04-18 DIAGNOSIS — E78.5 HYPERLIPIDEMIA LDL GOAL <100: ICD-10-CM

## 2025-04-18 PROCEDURE — 3080F DIAST BP >= 90 MM HG: CPT | Performed by: NURSE PRACTITIONER

## 2025-04-18 PROCEDURE — 99214 OFFICE O/P EST MOD 30 MIN: CPT | Performed by: NURSE PRACTITIONER

## 2025-04-18 PROCEDURE — 3075F SYST BP GE 130 - 139MM HG: CPT | Performed by: NURSE PRACTITIONER

## 2025-04-18 PROCEDURE — G2211 COMPLEX E/M VISIT ADD ON: HCPCS | Performed by: NURSE PRACTITIONER

## 2025-04-18 RX ORDER — DILTIAZEM HYDROCHLORIDE 180 MG/1
180 CAPSULE, EXTENDED RELEASE ORAL DAILY
Qty: 90 CAPSULE | Refills: 2 | Status: SHIPPED | OUTPATIENT
Start: 2025-04-18

## 2025-04-18 RX ORDER — ATENOLOL AND CHLORTHALIDONE TABLET 100; 25 MG/1; MG/1
1 TABLET ORAL DAILY
Qty: 90 TABLET | Refills: 3 | Status: SHIPPED | OUTPATIENT
Start: 2025-04-18

## 2025-04-18 RX ORDER — RIZATRIPTAN BENZOATE 5 MG/1
5 TABLET ORAL
Qty: 20 TABLET | Refills: 2 | Status: SHIPPED | OUTPATIENT
Start: 2025-04-18

## 2025-04-18 RX ORDER — ROSUVASTATIN CALCIUM 5 MG/1
5 TABLET, COATED ORAL DAILY
Qty: 90 TABLET | Refills: 3 | Status: SHIPPED | OUTPATIENT
Start: 2025-04-18

## 2025-04-18 RX ORDER — AMLODIPINE BESYLATE 5 MG/1
5 TABLET ORAL DAILY
Qty: 90 TABLET | Refills: 1 | Status: SHIPPED | OUTPATIENT
Start: 2025-04-18

## 2025-04-18 ASSESSMENT — ANXIETY QUESTIONNAIRES
2. NOT BEING ABLE TO STOP OR CONTROL WORRYING: SEVERAL DAYS
GAD7 TOTAL SCORE: 2
1. FEELING NERVOUS, ANXIOUS, OR ON EDGE: NOT AT ALL
GAD7 TOTAL SCORE: 2
IF YOU CHECKED OFF ANY PROBLEMS ON THIS QUESTIONNAIRE, HOW DIFFICULT HAVE THESE PROBLEMS MADE IT FOR YOU TO DO YOUR WORK, TAKE CARE OF THINGS AT HOME, OR GET ALONG WITH OTHER PEOPLE: NOT DIFFICULT AT ALL
7. FEELING AFRAID AS IF SOMETHING AWFUL MIGHT HAPPEN: NOT AT ALL
8. IF YOU CHECKED OFF ANY PROBLEMS, HOW DIFFICULT HAVE THESE MADE IT FOR YOU TO DO YOUR WORK, TAKE CARE OF THINGS AT HOME, OR GET ALONG WITH OTHER PEOPLE?: NOT DIFFICULT AT ALL
4. TROUBLE RELAXING: NOT AT ALL
6. BECOMING EASILY ANNOYED OR IRRITABLE: NOT AT ALL
5. BEING SO RESTLESS THAT IT IS HARD TO SIT STILL: NOT AT ALL
3. WORRYING TOO MUCH ABOUT DIFFERENT THINGS: SEVERAL DAYS

## 2025-04-18 NOTE — PROGRESS NOTES
Assessment & Plan     Jeannie was seen today for recheck.    Diagnoses and all orders for this visit:    Morbid obesity (H)  Type 2 diabetes mellitus with hyperglycemia, without long-term current use of insulin (H)  Previously on Tirzepatide 10 mg daily with noted plateau in weight, will plan increase in Tirzepatide to 12.5 mg daily.    Stable Diabetes control.    Hemoglobin A1C   Date Value Ref Range Status   10/22/2024 5.7 (H) 0.0 - 5.6 % Final     Comment:     Normal <5.7%   Prediabetes 5.7-6.4%    Diabetes 6.5% or higher     Note: Adopted from ADA consensus guidelines.   01/26/2024 6.7 (H) 0.0 - 5.6 % Final     Comment:     Normal <5.7%   Prediabetes 5.7-6.4%    Diabetes 6.5% or higher     Note: Adopted from ADA consensus guidelines.   12/12/2023 6.5 (H) 0.0 - 5.6 % Final     Comment:     Normal <5.7%   Prediabetes 5.7-6.4%    Diabetes 6.5% or higher     Note: Adopted from ADA consensus guidelines.       -     tirzepatide (MOUNJARO) 12.5 MG/0.5ML SOAJ auto-injector pen; Inject 0.5 mLs (12.5 mg) subcutaneously once a week.    Benign essential hypertension  HTN, goal below 130/80  Stable on antihypertensive regimen.    -     diltiazem ER (DILT-XR) 180 MG 24 hr capsule; Take 1 capsule (180 mg) by mouth daily.  -     atenolol-chlorthalidone (TENORETIC) 100-25 MG tablet; Take 1 tablet by mouth daily.  -     amLODIPine (NORVASC) 5 MG tablet; Take 1 tablet (5 mg) by mouth daily.    Hyperlipidemia LDL goal <100  Recent Labs   Lab Test 10/22/24  1137 12/12/23  1049   CHOL 148 189   HDL 41* 49*   LDL 85 120*   TRIG 110 100   Stable on statin therapy.    -     rosuvastatin (CRESTOR) 5 MG tablet; Take 1 tablet (5 mg) by mouth daily.    Nonintractable headache, unspecified chronicity pattern, unspecified headache type  -     rizatriptan (MAXALT) 5 MG tablet; Take 1 tablet (5 mg) by mouth at onset of headache for migraine. May repeat in 2 hours. Max 6 tablets/24 hours.    Melanoma in situ of face (H)  -     Adult  "Dermatology  Referral; Future    Scoliosis of cervicothoracic spine, unspecified scoliosis type  -     Chiropractic Referral; Future    Thyroid nodule  Previous incidental finding on MRA of neck.  Plan surveillance thyroid ultrasound.    -     US Thyroid; Future    Screen for colon cancer  -     Colonoscopy Screening  Referral; Future    Other orders  -     REVIEW OF HEALTH MAINTENANCE PROTOCOL ORDERS        BMI  Estimated body mass index is 38.63 kg/m  as calculated from the following:    Height as of this encounter: 1.511 m (4' 11.5\").    Weight as of this encounter: 88.2 kg (194 lb 8 oz).     Return in about 3 months (around 7/18/2025) for Preventative Visit, pap smear.    The longitudinal plan of care for the diagnosis(es)/condition(s) as documented were addressed during this visit. Due to the added complexity in care, I will continue to support Jeannie in the subsequent management and with ongoing continuity of care.      Subjective   Jeannie is a 65 year old, presenting for the following health issues:  RECHECK        4/18/2025    10:55 AM   Additional Questions   Roomed by Annemarie BRAGG MA   Accompanied by Self       Via the Health Maintenance questionnaire, the patient has reported the following services have been completed , this information has been sent to the abstraction team.  History of Present Illness       Mental Health Follow-up:  Patient presents to follow-up on Anxiety.    Patient's anxiety since last visit has been:  Better  The patient is not having other symptoms associated with anxiety.  Any significant life events: grief or loss  Patient is not feeling anxious or having panic attacks.  Patient has no concerns about alcohol or drug use.    Diabetes:   She presents for follow up of diabetes.  She is checking home blood glucose a few times a month.   She checks blood glucose after meals.  Blood glucose is never over 200 and never under 70. She is aware of hypoglycemia symptoms " including shakiness.    She has no concerns regarding her diabetes at this time.   She is not experiencing numbness or burning in feet, excessive thirst, blurry vision, weight changes or redness, sores or blisters on feet. The patient has had a diabetic eye exam in the last 12 months. Eye exam performed on [id number]. Location of last eye exam not sure.        Hyperlipidemia:  She presents for follow up of hyperlipidemia.   She is taking medication to lower cholesterol. She is not having myalgia or other side effects to statin medications.    Hypertension: She presents for follow up of hypertension.  She does not check blood pressure  regularly outside of the clinic. Outside blood pressures have been over 140/90. She does not follow a low salt diet.     She eats 2-3 servings of fruits and vegetables daily.She consumes 0 sweetened beverage(s) daily.She exercises with enough effort to increase her heart rate 10 to 19 minutes per day.  She exercises with enough effort to increase her heart rate 3 or less days per week.   She is taking medications regularly.      Wt Readings from Last 4 Encounters:   04/18/25 88.2 kg (194 lb 8 oz)   10/22/24 89.4 kg (197 lb)   04/09/24 97.1 kg (214 lb)   03/15/24 99.7 kg (219 lb 12.8 oz)     Weight has hit a plateau.    No significant adverse effects.  Some intermittent constipation, Metamucil is helpful.    Started Mounjaro 1 year and 4 months ago.      Hemoglobin A1C   Date Value Ref Range Status   10/22/2024 5.7 (H) 0.0 - 5.6 % Final     Comment:     Normal <5.7%   Prediabetes 5.7-6.4%    Diabetes 6.5% or higher     Note: Adopted from ADA consensus guidelines.   01/26/2024 6.7 (H) 0.0 - 5.6 % Final     Comment:     Normal <5.7%   Prediabetes 5.7-6.4%    Diabetes 6.5% or higher     Note: Adopted from ADA consensus guidelines.   12/12/2023 6.5 (H) 0.0 - 5.6 % Final     Comment:     Normal <5.7%   Prediabetes 5.7-6.4%    Diabetes 6.5% or higher     Note: Adopted from ADA consensus  "guidelines.     Has been off Sertraline for over one year.   Doing well.   Does have some anxiety.        1/26/2024     8:47 AM 3/15/2024     9:43 AM 4/18/2025    10:44 AM   PATRICIA-7 SCORE   Total Score 2 (minimal anxiety) 3 (minimal anxiety) 2 (minimal anxiety)   Total Score 2 3 2        Patient-reported         12/12/2023     9:40 AM 1/26/2024     8:46 AM 3/15/2024     9:42 AM   PHQ   PHQ-9 Total Score 11 5 2   Q9: Thoughts of better off dead/self-harm past 2 weeks Not at all Not at all Not at all     History of scoliosis.  Intermittent tingling in bilateral arms, more on the left upper extremity.  Burning sensation in lower neck.    Would like to go to chiropractor for further care.          Review of Systems  Constitutional, HEENT, cardiovascular, pulmonary, gi and gu systems are negative, except as otherwise noted.      Objective    /90   Pulse 71   Temp 96.9  F (36.1  C) (Tympanic)   Resp 20   Ht 1.511 m (4' 11.5\")   Wt 88.2 kg (194 lb 8 oz)   LMP  (LMP Unknown)   SpO2 94%   BMI 38.63 kg/m    Body mass index is 38.63 kg/m .    Physical Exam     GENERAL: alert and no distress  EYES: Eyes grossly normal to inspection  NECK: no adenopathy, no asymmetry, masses, or scars  RESP: lungs clear to auscultation - no rales, rhonchi or wheezes  CV: regular rate and rhythm, normal S1 S2, no S3 or S4, no murmur, click or rub, no peripheral edema  NEURO: Normal strength and tone, mentation intact and speech normal  PSYCH: mentation appears normal, affect normal/bright        Signed Electronically by: Aliyah Long, RAFAEL CNP    "

## 2025-04-23 ENCOUNTER — PATIENT OUTREACH (OUTPATIENT)
Dept: CARE COORDINATION | Facility: CLINIC | Age: 65
End: 2025-04-23
Payer: COMMERCIAL

## 2025-06-21 ENCOUNTER — RESULTS FOLLOW-UP (OUTPATIENT)
Dept: FAMILY MEDICINE | Facility: CLINIC | Age: 65
End: 2025-06-21

## 2025-06-21 DIAGNOSIS — E04.2 MULTIPLE THYROID NODULES: Primary | ICD-10-CM

## 2025-07-08 ENCOUNTER — HOSPITAL ENCOUNTER (OUTPATIENT)
Dept: ULTRASOUND IMAGING | Facility: CLINIC | Age: 65
Discharge: HOME | End: 2025-07-08
Attending: NURSE PRACTITIONER
Payer: COMMERCIAL

## 2025-07-08 DIAGNOSIS — E04.2 MULTIPLE THYROID NODULES: ICD-10-CM

## 2025-07-08 DIAGNOSIS — E04.1 THYROID NODULE: Primary | ICD-10-CM

## 2025-07-08 PROCEDURE — 88173 CYTOPATH EVAL FNA REPORT: CPT | Mod: 26 | Performed by: PATHOLOGY

## 2025-07-08 PROCEDURE — 10006 FNA BX W/US GDN EA ADDL: CPT

## 2025-07-08 PROCEDURE — 250N000009 HC RX 250: Performed by: RADIOLOGY

## 2025-07-08 PROCEDURE — 88173 CYTOPATH EVAL FNA REPORT: CPT | Mod: TC | Performed by: NURSE PRACTITIONER

## 2025-07-08 RX ADMIN — LIDOCAINE HYDROCHLORIDE 10 ML: 10 INJECTION, SOLUTION EPIDURAL; INFILTRATION; INTRACAUDAL; PERINEURAL at 14:16

## 2025-07-08 NOTE — PROGRESS NOTES
Thyroid FNA on Right thyroid nodule and isthmus nodule done. Specimens sent to lab on wet and dry slides and in [employer]. Patient tolerated procedure well. Discharge instructions explained to patient and states she understands. Patient left in stable condition ambulatory.

## 2025-07-10 DIAGNOSIS — G47.00 INSOMNIA, UNSPECIFIED TYPE: ICD-10-CM

## 2025-07-10 LAB
PATH REPORT.COMMENTS IMP SPEC: NORMAL
PATH REPORT.FINAL DX SPEC: NORMAL
PATH REPORT.FINAL DX SPEC: NORMAL
PATH REPORT.GROSS SPEC: NORMAL
PATH REPORT.GROSS SPEC: NORMAL
PATH REPORT.MICROSCOPIC SPEC OTHER STN: NORMAL
PATH REPORT.MICROSCOPIC SPEC OTHER STN: NORMAL
PATH REPORT.RELEVANT HX SPEC: NORMAL
PATH REPORT.RELEVANT HX SPEC: NORMAL

## 2025-07-10 RX ORDER — HYDROXYZINE HYDROCHLORIDE 10 MG/1
TABLET, FILM COATED ORAL
Qty: 180 TABLET | Refills: 2 | Status: SHIPPED | OUTPATIENT
Start: 2025-07-10

## 2025-07-11 PROBLEM — E04.1 THYROID NODULE: Status: ACTIVE | Noted: 2025-07-11

## 2025-08-08 DIAGNOSIS — E66.01 MORBID OBESITY (H): ICD-10-CM

## 2025-08-08 DIAGNOSIS — E11.65 TYPE 2 DIABETES MELLITUS WITH HYPERGLYCEMIA, WITHOUT LONG-TERM CURRENT USE OF INSULIN (H): ICD-10-CM

## 2025-08-09 RX ORDER — TIRZEPATIDE 12.5 MG/.5ML
INJECTION, SOLUTION SUBCUTANEOUS
Qty: 2 ML | Refills: 0 | Status: SHIPPED | OUTPATIENT
Start: 2025-08-09 | End: 2025-08-13

## 2025-08-13 ENCOUNTER — OFFICE VISIT (OUTPATIENT)
Dept: FAMILY MEDICINE | Facility: CLINIC | Age: 65
End: 2025-08-13
Payer: COMMERCIAL

## 2025-08-13 VITALS
HEART RATE: 67 BPM | TEMPERATURE: 97.6 F | WEIGHT: 195.5 LBS | RESPIRATION RATE: 18 BRPM | HEIGHT: 60 IN | OXYGEN SATURATION: 95 % | SYSTOLIC BLOOD PRESSURE: 130 MMHG | DIASTOLIC BLOOD PRESSURE: 86 MMHG | BODY MASS INDEX: 38.38 KG/M2

## 2025-08-13 DIAGNOSIS — E11.65 TYPE 2 DIABETES MELLITUS WITH HYPERGLYCEMIA, WITHOUT LONG-TERM CURRENT USE OF INSULIN (H): ICD-10-CM

## 2025-08-13 DIAGNOSIS — E78.5 HYPERLIPIDEMIA LDL GOAL <100: ICD-10-CM

## 2025-08-13 DIAGNOSIS — Z13.0 SCREENING FOR DEFICIENCY ANEMIA: ICD-10-CM

## 2025-08-13 DIAGNOSIS — Z12.4 CERVICAL CANCER SCREENING: ICD-10-CM

## 2025-08-13 DIAGNOSIS — I10 BENIGN ESSENTIAL HYPERTENSION: ICD-10-CM

## 2025-08-13 DIAGNOSIS — Z00.00 ROUTINE GENERAL MEDICAL EXAMINATION AT A HEALTH CARE FACILITY: Primary | ICD-10-CM

## 2025-08-13 DIAGNOSIS — I10 HTN, GOAL BELOW 130/80: ICD-10-CM

## 2025-08-13 DIAGNOSIS — E66.01 MORBID OBESITY (H): ICD-10-CM

## 2025-08-13 DIAGNOSIS — Z12.11 SCREEN FOR COLON CANCER: ICD-10-CM

## 2025-08-13 LAB
ALBUMIN SERPL BCG-MCNC: 4 G/DL (ref 3.5–5.2)
ALP SERPL-CCNC: 84 U/L (ref 40–150)
ALT SERPL W P-5'-P-CCNC: 28 U/L (ref 0–50)
ANION GAP SERPL CALCULATED.3IONS-SCNC: 10 MMOL/L (ref 7–15)
AST SERPL W P-5'-P-CCNC: 25 U/L (ref 0–45)
BILIRUB SERPL-MCNC: 0.3 MG/DL
BUN SERPL-MCNC: 13.6 MG/DL (ref 8–23)
CALCIUM SERPL-MCNC: 9.3 MG/DL (ref 8.8–10.4)
CHLORIDE SERPL-SCNC: 100 MMOL/L (ref 98–107)
CHOLEST SERPL-MCNC: 139 MG/DL
CREAT SERPL-MCNC: 0.61 MG/DL (ref 0.51–0.95)
EGFRCR SERPLBLD CKD-EPI 2021: >90 ML/MIN/1.73M2
ERYTHROCYTE [DISTWIDTH] IN BLOOD BY AUTOMATED COUNT: 13.8 % (ref 10–15)
EST. AVERAGE GLUCOSE BLD GHB EST-MCNC: 123 MG/DL
FASTING STATUS PATIENT QL REPORTED: YES
FASTING STATUS PATIENT QL REPORTED: YES
GLUCOSE SERPL-MCNC: 105 MG/DL (ref 70–99)
HBA1C MFR BLD: 5.9 % (ref 0–5.6)
HCO3 SERPL-SCNC: 29 MMOL/L (ref 22–29)
HCT VFR BLD AUTO: 48.3 % (ref 35–47)
HDLC SERPL-MCNC: 43 MG/DL
HGB BLD-MCNC: 15.9 G/DL (ref 11.7–15.7)
LDLC SERPL CALC-MCNC: 76 MG/DL
MCH RBC QN AUTO: 30.6 PG (ref 26.5–33)
MCHC RBC AUTO-ENTMCNC: 32.9 G/DL (ref 31.5–36.5)
MCV RBC AUTO: 92.9 FL (ref 78–100)
NONHDLC SERPL-MCNC: 96 MG/DL
PLATELET # BLD AUTO: 326 10E3/UL (ref 150–450)
POTASSIUM SERPL-SCNC: 3.5 MMOL/L (ref 3.4–5.3)
PROT SERPL-MCNC: 6.9 G/DL (ref 6.4–8.3)
RBC # BLD AUTO: 5.2 10E6/UL (ref 3.8–5.2)
SODIUM SERPL-SCNC: 139 MMOL/L (ref 135–145)
TRIGL SERPL-MCNC: 101 MG/DL
WBC # BLD AUTO: 10.28 10E3/UL (ref 4–11)

## 2025-08-13 PROCEDURE — G2211 COMPLEX E/M VISIT ADD ON: HCPCS | Performed by: NURSE PRACTITIONER

## 2025-08-13 PROCEDURE — 83036 HEMOGLOBIN GLYCOSYLATED A1C: CPT | Performed by: NURSE PRACTITIONER

## 2025-08-13 PROCEDURE — 80053 COMPREHEN METABOLIC PANEL: CPT | Performed by: NURSE PRACTITIONER

## 2025-08-13 PROCEDURE — 3044F HG A1C LEVEL LT 7.0%: CPT | Performed by: NURSE PRACTITIONER

## 2025-08-13 PROCEDURE — 3079F DIAST BP 80-89 MM HG: CPT | Performed by: NURSE PRACTITIONER

## 2025-08-13 PROCEDURE — 80061 LIPID PANEL: CPT | Performed by: NURSE PRACTITIONER

## 2025-08-13 PROCEDURE — 99396 PREV VISIT EST AGE 40-64: CPT | Performed by: NURSE PRACTITIONER

## 2025-08-13 PROCEDURE — 85027 COMPLETE CBC AUTOMATED: CPT | Performed by: NURSE PRACTITIONER

## 2025-08-13 PROCEDURE — 99214 OFFICE O/P EST MOD 30 MIN: CPT | Mod: 25 | Performed by: NURSE PRACTITIONER

## 2025-08-13 PROCEDURE — 36415 COLL VENOUS BLD VENIPUNCTURE: CPT | Performed by: NURSE PRACTITIONER

## 2025-08-13 PROCEDURE — 3075F SYST BP GE 130 - 139MM HG: CPT | Performed by: NURSE PRACTITIONER

## 2025-08-13 RX ORDER — ROSUVASTATIN CALCIUM 5 MG/1
5 TABLET, COATED ORAL DAILY
Qty: 90 TABLET | Refills: 3 | Status: SHIPPED | OUTPATIENT
Start: 2025-08-13

## 2025-08-13 RX ORDER — DILTIAZEM HYDROCHLORIDE 180 MG/1
180 CAPSULE, EXTENDED RELEASE ORAL DAILY
Qty: 90 CAPSULE | Refills: 3 | Status: SHIPPED | OUTPATIENT
Start: 2025-08-13

## 2025-08-13 RX ORDER — AMLODIPINE BESYLATE 5 MG/1
5 TABLET ORAL DAILY
Qty: 90 TABLET | Refills: 1 | Status: SHIPPED | OUTPATIENT
Start: 2025-08-13

## 2025-08-13 RX ORDER — ATENOLOL AND CHLORTHALIDONE TABLET 100; 25 MG/1; MG/1
1 TABLET ORAL DAILY
Qty: 90 TABLET | Refills: 3 | Status: SHIPPED | OUTPATIENT
Start: 2025-08-13

## 2025-08-13 SDOH — HEALTH STABILITY: PHYSICAL HEALTH: ON AVERAGE, HOW MANY DAYS PER WEEK DO YOU ENGAGE IN MODERATE TO STRENUOUS EXERCISE (LIKE A BRISK WALK)?: 3 DAYS

## 2025-08-13 SDOH — HEALTH STABILITY: PHYSICAL HEALTH: ON AVERAGE, HOW MANY MINUTES DO YOU ENGAGE IN EXERCISE AT THIS LEVEL?: 10 MIN

## 2025-08-13 ASSESSMENT — SOCIAL DETERMINANTS OF HEALTH (SDOH): HOW OFTEN DO YOU GET TOGETHER WITH FRIENDS OR RELATIVES?: TWICE A WEEK

## 2025-08-14 ENCOUNTER — RESULTS FOLLOW-UP (OUTPATIENT)
Dept: FAMILY MEDICINE | Facility: CLINIC | Age: 65
End: 2025-08-14
Payer: COMMERCIAL

## 2025-08-27 ENCOUNTER — RESULTS FOLLOW-UP (OUTPATIENT)
Dept: DERMATOLOGY | Facility: CLINIC | Age: 65
End: 2025-08-27
Payer: COMMERCIAL